# Patient Record
Sex: MALE | Race: BLACK OR AFRICAN AMERICAN | Employment: OTHER | URBAN - METROPOLITAN AREA
[De-identification: names, ages, dates, MRNs, and addresses within clinical notes are randomized per-mention and may not be internally consistent; named-entity substitution may affect disease eponyms.]

---

## 2017-01-17 ENCOUNTER — OFFICE VISIT (OUTPATIENT)
Dept: ORTHOPEDIC SURGERY | Age: 72
End: 2017-01-17

## 2017-01-17 VITALS
HEIGHT: 66 IN | WEIGHT: 210.4 LBS | HEART RATE: 60 BPM | RESPIRATION RATE: 18 BRPM | SYSTOLIC BLOOD PRESSURE: 155 MMHG | BODY MASS INDEX: 33.82 KG/M2 | DIASTOLIC BLOOD PRESSURE: 76 MMHG

## 2017-01-17 DIAGNOSIS — M54.16 RADICULOPATHY, LUMBAR REGION: ICD-10-CM

## 2017-01-17 DIAGNOSIS — M48.061 SPINAL STENOSIS, LUMBAR: Primary | ICD-10-CM

## 2017-01-17 RX ORDER — DIAZEPAM 10 MG/1
TABLET ORAL
Qty: 1 TAB | Refills: 0 | OUTPATIENT
Start: 2017-01-17 | End: 2017-02-21 | Stop reason: ALTCHOICE

## 2017-01-17 NOTE — PROGRESS NOTES
[facility]  [address], [place]   Phone: [phone]  Fax: [phone]        PROGRESS NOTE      HISTORY OF PRESENT ILLNESS:  The patient is a 80 y.o. male and was seen today for follow up of low back pain without radiculopathy. Previously, he did report radicular symptoms extending into the bilateral lower extremities in an S1 distribution with right lower extremity symptoms greater than left to the feet. He actually describes lower extremity symptoms more as paresthesias and not so much pain. He reports symptoms began 10 years ago. He reported limitations in standing and walking tolerance. He describes symptoms consistent for stenosis. He is taking Norco for pain relief, which he reports really is not helping much. He has failed NEURONTIN and TOPAMAX. Patient is intolerant to CYMBALTA 30 mg. He does report having earaches with chewing after starting medication which I suspect is unrelated. The patient has a history of DM and reports blood sugars are well controlled, consistently remaining below 200. He reports resolution of numbness of the BLE with L3/4 epidural from 9/14/16. He is still having pain and a feeling of weakness in his lower back, especially while cutting the grass. MRI of the lumbar spine per report, there was severe L4-L5, moderate L3-L4 and L5-S1 and mild L2-L3 level central stenosis from a combination of minimal degenerative disc disease, mild to moderate facet joint arthrosis, ligamentum flavum thickening and developmental minimal to mild canal stenosis. There was mild to moderate left L4-L5, mild right L4-L5, bilateral L5-S1 and L3-L4 level foraminal stenosis. There was borderline left L4 nerve root impingement. There was mild L5-S1 Baastrup's disease/intraspinous ligament degenerative and/or bursitis.  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 At his last clinical appointment, treatment options, including surgery, continued injections, PT, and other medications were discussed. At that time the patient wished to wait and see if relief continued from the block. The patient returns today with low back and recurrence of BLE symptoms which extend in an S1 distribution to the ankle with paraesthesias. He rates pain 0-6/10, an increase since his last visit (2-4/10). Patient states BLE symptoms presented again two weeks after his block on 9/14/16 which he did not report at his last visit. He believes his symptoms have progressed.  reviewed. Past Medical History   Diagnosis Date    Arthritis     Diabetes ([facility] [address]     GERD (gastroesophageal reflux disease)     Gout     Hypercholesterolemia     Hypertension     Low back pain     Vitamin D deficiency         Social History     Social History    Marital status:      Spouse name: N/A    Number of children: N/A    Years of education: N/A     Occupational History    Not on file. Social History Main Topics    Smoking status: Never Smoker    Smokeless tobacco: Never Used    Alcohol use No    Drug use: No    Sexual activity: Not on file     Other Topics Concern    Not on file     Social History Narrative       Current Outpatient Prescriptions   Medication Sig Dispense Refill    ergocalciferol (VITAMIN D2) 50,000 unit capsule Take 50,000 Units by mouth.  amLODIPine (NORVASC) 10 mg tablet Take  by mouth daily.  benazepril-hydrochlorthiazide (LOTENSIN HCT) 10-12.5 mg per tablet Take  by mouth daily.  metoprolol succinate (TOPROL-XL) 200 mg XL tablet Take 200 mg by mouth daily.  HYDROCODONE/ACETAMINOPHEN (NORCO PO) Take  by mouth.  diclofenac EC (VOLTAREN) 75 mg EC tablet Take  by mouth.  atorvastatin (LIPITOR) 20 mg tablet Take  by mouth daily.       metFORMIN (GLUCOPHAGE) 500 mg tablet Take  by mouth two (2) times daily (with meals).  glipiZIDE (GLUCOTROL) 10 mg tablet Take 10 mg by mouth two (2) times a day.  esomeprazole (NEXIUM) 40 mg capsule Take  by mouth daily.  diazepam (VALIUM) 10 mg tablet Take 1 tab by mouth as directed by nurse prior to procedure 1 Tab 0    DULoxetine (CYMBALTA) 30 mg capsule 1 tab PO Daily 30 Cap 1    topiramate (TOPAMAX) 25 mg tablet 3 tabs PO QHS 90 Tab 1    gabapentin (NEURONTIN) 600 mg tablet Take 1 Tab by mouth three (3) times daily. 90 Tab 1    gabapentin (NEURONTIN) 300 mg capsule Take 1 Cap by mouth three (3) times daily. 90 Cap 1    colchicine (COLCRYS) 0.6 mg tablet Take 0.6 mg by mouth daily. Allergies   Allergen Reactions    Topamax [Topiramate] Other (comments)     Aching and popping behind left ear          PHYSICAL EXAMINATION    Visit Vitals    /76 (BP 1 Location: Right arm, BP Patient Position: Sitting)    Pulse 60    Resp 18    Ht 5' 6\" (1.676 m)    Wt 210 lb 6.4 oz (95.4 kg)    BMI 33.96 kg/m2       CONSTITUTIONAL: NAD, A&O x 3  SENSATION: Intact to light touch throughout  RANGE OF MOTION: The patient has full passive range of motion in all four extremities. MOTOR:  Straight Leg Raise: Negative, bilateral               Hip Flex Knee Ext Knee Flex Ankle DF GTE Ankle PF Tone   Right +4/5 +4/5 +4/5 +4/5 +4/5 +4/5 +4/5   Left +4/5 +4/5 +4/5 +4/5 +4/5 +4/5 +4/5       ASSESSMENT   Racheal Magallanes was seen today for back pain and leg pain. Diagnoses and all orders for this visit:    Spinal stenosis, lumbar  -     SCHEDULE SURGERY    Radiculopathy, lumbar region  -     SCHEDULE SURGERY          IMPRESSION AND PLAN:  He has continued low back pain and reports recurrence of his bilateral lower extremity symptoms which he describes as S1 radiculopathy. I suspect his symptoms are a combination of lumbar radiculopathy and peripheral neuropathy, but primarily due to radiculopathy.  Patient declines physical therapy at this time. He elects to proceed with a lumbar block. I will order a L3/4 epidural. I will see the patient back following block. Written by Danny Brandt, as dictated by Betty Rutledge MD  I examined the patient, reviewed and agree with the note.

## 2017-01-17 NOTE — MR AVS SNAPSHOT
Visit Information Date & Time Provider Department Dept. Phone Encounter #  
 1/17/2017 11:20 AM Carin Harman  [facility], [address] and [facility] - [place] [phone] Follow-up Instructions Return for following block. Upcoming Health Maintenance Date Due Hepatitis C Screening 1945 DTaP/Tdap/Td series (1 - Tdap) 6/11/1967 FOBT Q 1 YEAR AGE 50-75 6/11/1996 ZOSTER VACCINE AGE 60> 6/11/2006 GLAUCOMA SCREENING Q2Y 6/11/2011 Pneumococcal 65+ Low/Medium Risk (1 of 2 - PCV13) 6/11/2011 MEDICARE YEARLY EXAM 6/11/2011 INFLUENZA AGE 9 TO ADULT 8/1/2016 Allergies as of 1/17/2017  Review Complete On: 1/17/2017 By: Carin Harman MD  
  
 Severity Noted Reaction Type Reactions Topamax [Topiramate]  02/17/2016    Other (comments) Aching and popping behind left ear Current Immunizations  Never Reviewed No immunizations on file. Not reviewed this visit You Were Diagnosed With   
  
 Codes Comments Spinal stenosis, lumbar    -  Primary ICD-10-CM: M48.06 
ICD-9-CM: 724.02 Radiculopathy, lumbar region     ICD-10-CM: M54.16 
ICD-9-CM: 724.4 Vitals BP Pulse Resp Height(growth percentile) Weight(growth percentile) BMI  
 155/76 (BP 1 Location: Right arm, BP Patient Position: Sitting) 60 18 5' 6\" (1.676 m) 210 lb 6.4 oz (95.4 kg) 33.96 kg/m2 Smoking Status Never Smoker BMI and BSA Data Body Mass Index Body Surface Area  
 33.96 kg/m 2 2.11 m 2 Preferred Pharmacy Pharmacy Name Phone [facility] [address], [address] Your Updated Medication List  
  
   
This list is accurate as of: 1/17/17 12:25 PM.  Always use your most recent med list. amLODIPine 10 mg tablet Commonly known as:  Dennise Pace Take  by mouth daily. atorvastatin 20 mg tablet Commonly known as:  LIPITOR Take  by mouth daily. benazepril-hydroCHLOROthiazide 10-12.5 mg per tablet Commonly known as:  LOTENSIN HCT Take  by mouth daily. COLCRYS 0.6 mg tablet Generic drug:  colchicine Take 0.6 mg by mouth daily. diazePAM 10 mg tablet Commonly known as:  VALIUM Take 1 tab by mouth as directed by nurse prior to procedure  
  
 diclofenac EC 75 mg EC tablet Commonly known as:  VOLTAREN Take  by mouth. DULoxetine 30 mg capsule Commonly known as:  CYMBALTA 1 tab PO Daily * gabapentin 300 mg capsule Commonly known as:  NEURONTIN Take 1 Cap by mouth three (3) times daily. * gabapentin 600 mg tablet Commonly known as:  NEURONTIN Take 1 Tab by mouth three (3) times daily. glipiZIDE 10 mg tablet Commonly known as:  Atlee Crystal Take 10 mg by mouth two (2) times a day. metFORMIN 500 mg tablet Commonly known as:  GLUCOPHAGE Take  by mouth two (2) times daily (with meals). metoprolol succinate 200 mg XL tablet Commonly known as:  TOPROL-XL Take 200 mg by mouth daily. NexIUM 40 mg capsule Generic drug:  esomeprazole Take  by mouth daily. NORCO PO Take  by mouth. topiramate 25 mg tablet Commonly known as:  TOPAMAX 3 tabs PO QHS  
  
 VITAMIN D2 50,000 unit capsule Generic drug:  ergocalciferol Take 50,000 Units by mouth. * Notice: This list has 2 medication(s) that are the same as other medications prescribed for you. Read the directions carefully, and ask your doctor or other care provider to review them with you. Follow-up Instructions Return for following block. Introducing [facility]! Jeanie Lopez introduces b5media patient portal. Now you can access parts of your medical record, email your doctor's office, and request medication refills online. 1. In your internet browser, go to [email or web address] 2. Click on the First Time User? Click Here link in the Sign In box. You will see the New Member Sign Up page. 3. Enter your Layer Access Code exactly as it appears below. You will not need to use this code after youve completed the sign-up process. If you do not sign up before the expiration date, you must request a new code. · Layer Access Code: [email or web address] Expires: 4/17/2017 11:13 AM 
 
4. Enter the last four digits of your Social Security Number (xxxx) and Date of Birth (mm/dd/yyyy) as indicated and click Submit. You will be taken to the next sign-up page. 5. Create a Layer ID. This will be your Layer login ID and cannot be changed, so think of one that is secure and easy to remember. 6. Create a Layer password. You can change your password at any time. 7. Enter your Password Reset Question and Answer. This can be used at a later time if you forget your password. 8. Enter your e-mail address. You will receive e-mail notification when new information is available in [address] 9. Click Sign Up. You can now view and download portions of your medical record. 10. Click the Download Summary menu link to download a portable copy of your medical information. If you have questions, please visit the Frequently Asked Questions section of the Layer website. Remember, Layer is NOT to be used for urgent needs. For medical emergencies, dial 911. Now available from your iPhone and Android! Please provide this summary of care documentation to your next provider. Your primary care clinician is listed as Ty Rolling. If you have any questions after today's visit, please call [phone].

## 2017-02-21 ENCOUNTER — OFFICE VISIT (OUTPATIENT)
Dept: ORTHOPEDIC SURGERY | Age: 72
End: 2017-02-21

## 2017-02-21 VITALS
DIASTOLIC BLOOD PRESSURE: 79 MMHG | HEART RATE: 67 BPM | RESPIRATION RATE: 18 BRPM | SYSTOLIC BLOOD PRESSURE: 171 MMHG | BODY MASS INDEX: 33.33 KG/M2 | WEIGHT: 207.4 LBS | HEIGHT: 66 IN

## 2017-02-21 DIAGNOSIS — M54.16 RADICULOPATHY, LUMBAR REGION: ICD-10-CM

## 2017-02-21 DIAGNOSIS — M48.061 LUMBAR SPINAL STENOSIS: ICD-10-CM

## 2017-02-21 DIAGNOSIS — G60.9 HEREDITARY AND IDIOPATHIC PERIPHERAL NEUROPATHY: Primary | ICD-10-CM

## 2017-02-21 DIAGNOSIS — M47.816 SPONDYLOSIS WITHOUT MYELOPATHY OR RADICULOPATHY, LUMBAR REGION: ICD-10-CM

## 2017-02-21 RX ORDER — PREGABALIN 75 MG/1
75 CAPSULE ORAL 2 TIMES DAILY
Qty: 14 CAP | Refills: 0 | Status: SHIPPED | OUTPATIENT
Start: 2017-02-21 | End: 2017-03-24 | Stop reason: SDUPTHER

## 2017-02-21 RX ORDER — PREGABALIN 75 MG/1
75 CAPSULE ORAL 2 TIMES DAILY
Qty: 60 CAP | Refills: 1 | Status: SHIPPED | OUTPATIENT
Start: 2017-02-21 | End: 2017-03-24 | Stop reason: SDUPTHER

## 2017-02-21 RX ORDER — PREGABALIN 75 MG/1
75 CAPSULE ORAL 2 TIMES DAILY
Qty: 50 CAP | Refills: 1 | Status: SHIPPED | OUTPATIENT
Start: 2017-02-21 | End: 2017-02-21 | Stop reason: CLARIF

## 2017-02-21 NOTE — MR AVS SNAPSHOT
Visit Information Date & Time Provider Department Dept. Phone Encounter #  
 2/21/2017  8:00 AM Gonzalo Beasley MD 4 [facility], [address] and [facility] - [place] [phone] [id number] Follow-up Instructions Return in about 4 weeks (around 3/21/2017). Upcoming Health Maintenance Date Due Hepatitis C Screening 1945 DTaP/Tdap/Td series (1 - Tdap) 6/11/1967 FOBT Q 1 YEAR AGE 50-75 6/11/1996 ZOSTER VACCINE AGE 60> 6/11/2006 GLAUCOMA SCREENING Q2Y 6/11/2011 Pneumococcal 65+ Low/Medium Risk (1 of 2 - PCV13) 6/11/2011 MEDICARE YEARLY EXAM 6/11/2011 INFLUENZA AGE 9 TO ADULT 8/1/2016 Allergies as of 2/21/2017  Review Complete On: 2/21/2017 By: Gonzalo Beasley MD  
  
 Severity Noted Reaction Type Reactions Topamax [Topiramate]  02/17/2016    Other (comments) Aching and popping behind left ear Current Immunizations  Never Reviewed No immunizations on file. Not reviewed this visit You Were Diagnosed With   
  
 Codes Comments Hereditary and idiopathic peripheral neuropathy    -  Primary ICD-10-CM: G60.9 ICD-9-CM: 356.9 Spondylosis without myelopathy or radiculopathy, lumbar region     ICD-10-CM: M47.816 ICD-9-CM: 721.3 Lumbar spinal stenosis     ICD-10-CM: M48.06 
ICD-9-CM: 724.02 Radiculopathy, lumbar region     ICD-10-CM: M54.16 
ICD-9-CM: 724.4 Vitals BP Pulse Resp Height(growth percentile) Weight(growth percentile) BMI  
 171/79 (BP 1 Location: Left arm, BP Patient Position: Sitting) 67 18 5' 6\" (1.676 m) 207 lb 6.4 oz (94.1 kg) 33.48 kg/m2 Smoking Status Never Smoker Vitals History BMI and BSA Data Body Mass Index Body Surface Area  
 33.48 kg/m 2 2.09 m 2 Preferred Pharmacy Pharmacy Name Phone [facility] [address], [address] Your Updated Medication List  
  
   
 This list is accurate as of: 2/21/17  8:31 AM.  Always use your most recent med list. amLODIPine 10 mg tablet Commonly known as:  Woodrow Citron Take  by mouth daily. atorvastatin 20 mg tablet Commonly known as:  LIPITOR Take  by mouth daily. benazepril-hydroCHLOROthiazide 10-12.5 mg per tablet Commonly known as:  LOTENSIN HCT Take  by mouth daily. COLCRYS 0.6 mg tablet Generic drug:  colchicine Take 0.6 mg by mouth daily. diclofenac EC 75 mg EC tablet Commonly known as:  VOLTAREN Take  by mouth. DULoxetine 30 mg capsule Commonly known as:  CYMBALTA 1 tab PO Daily * gabapentin 300 mg capsule Commonly known as:  NEURONTIN Take 1 Cap by mouth three (3) times daily. * gabapentin 600 mg tablet Commonly known as:  NEURONTIN Take 1 Tab by mouth three (3) times daily. glipiZIDE 10 mg tablet Commonly known as:  Maira Bonds Take 10 mg by mouth two (2) times a day. metFORMIN 500 mg tablet Commonly known as:  GLUCOPHAGE Take  by mouth two (2) times daily (with meals). metoprolol succinate 200 mg XL tablet Commonly known as:  TOPROL-XL Take 200 mg by mouth daily. NexIUM 40 mg capsule Generic drug:  esomeprazole Take  by mouth daily. NORCO PO Take  by mouth. * pregabalin 75 mg capsule Commonly known as:  Walda Smoker Take 1 Cap by mouth two (2) times a day. Max Daily Amount: 150 mg.  
  
 * pregabalin 75 mg capsule Commonly known as:  Walda Smoker Take 1 Cap by mouth two (2) times a day. Max Daily Amount: 150 mg.  
  
 topiramate 25 mg tablet Commonly known as:  TOPAMAX 3 tabs PO QHS  
  
 VITAMIN D2 50,000 unit capsule Generic drug:  ergocalciferol Take 50,000 Units by mouth. * Notice: This list has 4 medication(s) that are the same as other medications prescribed for you. Read the directions carefully, and ask your doctor or other care provider to review them with you. Prescriptions Printed Refills  
 pregabalin (LYRICA) 75 mg capsule 0 Sig: Take 1 Cap by mouth two (2) times a day. Max Daily Amount: 150 mg.  
 Class: Print Route: Oral  
 pregabalin (LYRICA) 75 mg capsule 1 Sig: Take 1 Cap by mouth two (2) times a day. Max Daily Amount: 150 mg.  
 Class: Print Route: Oral  
  
Follow-up Instructions Return in about 4 weeks (around 3/21/2017). Introducing [facility]! Dayanara Nava introduces Photo[employer]ia patient portal. Now you can access parts of your medical record, email your doctor's office, and request medication refills online. 1. In your internet browser, go to [email or web address] 2. Click on the First Time User? Click Here link in the Sign In box. You will see the New Member Sign Up page. 3. Enter your PhotoMania Access Code exactly as it appears below. You will not need to use this code after youve completed the sign-up process. If you do not sign up before the expiration date, you must request a new code. · PhotoMania Access Code: [email or web address] Expires: 4/17/2017 11:13 AM 
 
4. Enter the last four digits of your Social Security Number (xxxx) and Date of Birth (mm/dd/yyyy) as indicated and click Submit. You will be taken to the next sign-up page. 5. Create a PhotoMania ID. This will be your PhotoMania login ID and cannot be changed, so think of one that is secure and easy to remember. 6. Create a PhotoMania password. You can change your password at any time. 7. Enter your Password Reset Question and Answer. This can be used at a later time if you forget your password. 8. Enter your e-mail address. You will receive e-mail notification when new information is available in [address] 9. Click Sign Up. You can now view and download portions of your medical record. 10. Click the Download Summary menu link to download a portable copy of your medical information. If you have questions, please visit the Frequently Asked Questions section of the RadMitt website. Remember, [employer] is NOT to be used for urgent needs. For medical emergencies, dial 911. Now available from your iPhone and Android! Please provide this summary of care documentation to your next provider. Your primary care clinician is listed as Domenico Horner. If you have any questions after today's visit, please call [phone].

## 2017-02-21 NOTE — PROGRESS NOTES
[facility]  [address], [address]  Phone: [phone]  Fax: [phone]        PROGRESS NOTE      HISTORY OF PRESENT ILLNESS:  The patient is a 80 y.o. male and was seen today for follow up of low back and recurrence of BLE symptoms which extend in an S1 distribution to the ankle with paraesthesias. He actually describes lower extremity paraesthesias and not so much pain. He reports symptoms began 10 years ago and have progressed. He reported limitations in standing and walking tolerance. He describes symptoms consistent for stenosis. He is taking Norco for pain relief, which he reports really is not helping much. He has failed NEURONTIN and TOPAMAX. Patient is intolerant to CYMBALTA 30 mg. He does report having earaches with chewing after starting medication which I suspect is unrelated. The patient has a history of DM and reports blood sugars are well controlled, consistently remaining below 200. He reports resolution of numbness of the BLE with L3/4 epidural from 9/14/16. Patient states BLE symptoms presented again two weeks after his block on 9/14/16. He is still having pain and a feeling of weakness in his lower back, especially while cutting the grass. MRI of the lumbar spine per report, there was severe L4-L5, moderate L3-L4 and L5-S1 and mild L2-L3 level central stenosis from a combination of minimal degenerative disc disease, mild to moderate facet joint arthrosis, ligamentum flavum thickening and developmental minimal to mild canal stenosis. There was mild to moderate left L4-L5, mild right L4-L5, bilateral L5-S1 and L3-L4 level foraminal stenosis. There was borderline left L4 nerve root impingement. There was mild L5-S1 Baastrup's disease/intraspinous ligament degenerative and/or bursitis.  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 At his last clinical appointment, he had continued low back pain and reported recurrence of his bilateral lower extremity symptoms which he described as S1 radiculopathy. I suspected his symptoms were a combination of lumbar radiculopathy and peripheral neuropathy, but primarily due to radiculopathy. Patient declined physical therapy at that time. He elected to proceed with a lumbar block. I ordered a L3/4 epidural.     The patient returns today with pain location and distribution remain unchanged. He rates pain 0-5/10, a slight decrease since his last visit (0-6/10). He is status post L3/4 epidural from 1/31/17 reporting temporary relief x 2 weeks. Patient has not attended physical therapy/chiropractor. He reports BLE edema usually at night while sleeping which is more consistent with a vascular issue.  reviewed. Past Medical History   Diagnosis Date    Arthritis     Diabetes ([facility] [address]     GERD (gastroesophageal reflux disease)     Gout     Hypercholesterolemia     Hypertension     Low back pain     Vitamin D deficiency         Social History     Social History    Marital status:      Spouse name: N/A    Number of children: N/A    Years of education: N/A     Occupational History    Not on file. Social History Main Topics    Smoking status: Never Smoker    Smokeless tobacco: Never Used    Alcohol use No    Drug use: No    Sexual activity: Not on file     Other Topics Concern    Not on file     Social History Narrative       Current Outpatient Prescriptions   Medication Sig Dispense Refill    pregabalin (LYRICA) 75 mg capsule Take 1 Cap by mouth two (2) times a day. Max Daily Amount: 150 mg. 14 Cap 0    pregabalin (LYRICA) 75 mg capsule Take 1 Cap by mouth two (2) times a day. Max Daily Amount: 150 mg. 60 Cap 1    ergocalciferol (VITAMIN D2) 50,000 unit capsule Take 50,000 Units by mouth.       amLODIPine (NORVASC) 10 mg tablet Take  by mouth daily.  benazepril-hydrochlorthiazide (LOTENSIN HCT) 10-12.5 mg per tablet Take  by mouth daily.  metoprolol succinate (TOPROL-XL) 200 mg XL tablet Take 200 mg by mouth daily.  colchicine (COLCRYS) 0.6 mg tablet Take 0.6 mg by mouth daily.  atorvastatin (LIPITOR) 20 mg tablet Take  by mouth daily.  metFORMIN (GLUCOPHAGE) 500 mg tablet Take  by mouth two (2) times daily (with meals).  glipiZIDE (GLUCOTROL) 10 mg tablet Take 10 mg by mouth two (2) times a day.  esomeprazole (NEXIUM) 40 mg capsule Take  by mouth daily.  DULoxetine (CYMBALTA) 30 mg capsule 1 tab PO Daily 30 Cap 1    topiramate (TOPAMAX) 25 mg tablet 3 tabs PO QHS 90 Tab 1    gabapentin (NEURONTIN) 600 mg tablet Take 1 Tab by mouth three (3) times daily. 90 Tab 1    gabapentin (NEURONTIN) 300 mg capsule Take 1 Cap by mouth three (3) times daily. 90 Cap 1    HYDROCODONE/ACETAMINOPHEN (NORCO PO) Take  by mouth.  diclofenac EC (VOLTAREN) 75 mg EC tablet Take  by mouth. Allergies   Allergen Reactions    Topamax [Topiramate] Other (comments)     Aching and popping behind left ear          PHYSICAL EXAMINATION    Visit Vitals    /79 (BP 1 Location: Left arm, BP Patient Position: Sitting)  Comment: PT JUST TOOK HIS BP MEDS/ASYMPTOMATIC    Pulse 67    Resp 18    Ht 5' 6\" (1.676 m)    Wt 207 lb 6.4 oz (94.1 kg)    BMI 33.48 kg/m2       CONSTITUTIONAL: NAD, A&O x 3  SENSATION: Intact to light touch throughout  RANGE OF MOTION: The patient has full passive range of motion in all four extremities. MOTOR:  Straight Leg Raise: Negative, bilateral               Hip Flex Knee Ext Knee Flex Ankle DF GTE Ankle PF Tone   Right +4/5 +4/5 +4/5 +4/5 +4/5 +4/5 +4/5   Left +4/5 +4/5 +4/5 +4/5 +4/5 +4/5 +4/5       ASSESSMENT   Jacob Velazquez was seen today for back pain, leg pain and foot pain.     Diagnoses and all orders for this visit:    Hereditary and idiopathic peripheral neuropathy    Spondylosis without myelopathy or radiculopathy, lumbar region    Lumbar spinal stenosis    Radiculopathy, lumbar region    Other orders  -     Discontinue: pregabalin (LYRICA) 75 mg capsule; Take 1 Cap by mouth two (2) times a day. Max Daily Amount: 150 mg.  -     pregabalin (LYRICA) 75 mg capsule; Take 1 Cap by mouth two (2) times a day. Max Daily Amount: 150 mg.  -     pregabalin (LYRICA) 75 mg capsule; Take 1 Cap by mouth two (2) times a day. Max Daily Amount: 150 mg.          IMPRESSION AND PLAN:  He declines physical therapy at this time. I will try him on Lyrica 75 mg BID. The risks, benefits, and potential side effects of this medication were discussed. Patient understands and wished to proceed. Patient advised to call the office if intolerant to new medication. I will see the patient back in 1 month's time or earlier if needed. Written by Danette Turcios, as dictated by Lay Hogan MD  I examined the patient, reviewed and agree with the note.

## 2017-03-24 ENCOUNTER — OFFICE VISIT (OUTPATIENT)
Dept: ORTHOPEDIC SURGERY | Age: 72
End: 2017-03-24

## 2017-03-24 VITALS
BODY MASS INDEX: 35.03 KG/M2 | DIASTOLIC BLOOD PRESSURE: 66 MMHG | HEART RATE: 59 BPM | OXYGEN SATURATION: 97 % | WEIGHT: 218 LBS | RESPIRATION RATE: 20 BRPM | SYSTOLIC BLOOD PRESSURE: 149 MMHG | HEIGHT: 66 IN | TEMPERATURE: 99.3 F

## 2017-03-24 DIAGNOSIS — G60.9 HEREDITARY AND IDIOPATHIC PERIPHERAL NEUROPATHY: ICD-10-CM

## 2017-03-24 DIAGNOSIS — M54.16 RADICULOPATHY, LUMBAR REGION: ICD-10-CM

## 2017-03-24 DIAGNOSIS — M48.061 SPINAL STENOSIS, LUMBAR REGION: Primary | ICD-10-CM

## 2017-03-24 DIAGNOSIS — M47.816 SPONDYLOSIS WITHOUT MYELOPATHY OR RADICULOPATHY, LUMBAR REGION: ICD-10-CM

## 2017-03-24 RX ORDER — PREGABALIN 75 MG/1
75 CAPSULE ORAL 2 TIMES DAILY
Qty: 60 CAP | Refills: 2 | Status: SHIPPED | OUTPATIENT
Start: 2017-03-24

## 2017-03-24 NOTE — PROGRESS NOTES
[facility]  [address]  [address], [address]  Phone: [phone]  Fax: [phone]        PROGRESS NOTE      HISTORY OF PRESENT ILLNESS:  The patient is a 80 y.o. male and was seen today for follow up of low back and recurrence of BLE symptoms which extend in an S1 distribution to the ankle with paraesthesias. He actually describes lower extremity paraesthesias and not so much pain. He reports symptoms began 10 years ago and have progressed. He reported limitations in standing and walking tolerance. He describes symptoms consistent for stenosis. Patient is still having pain and a feeling of weakness in his lower back, especially while cutting the grass. He reports BLE edema usually at night while sleeping which is more consistent with a vascular issue. Patient has not attended physical therapy/chiropractor. He is taking [place] for pain relief, which he reports really is not helping much. He has failed NEURONTIN and TOPAMAX. Patient is intolerant to CYMBALTA 30 mg. He does report having earaches with chewing after starting medication which I suspect is unrelated. The patient has a history of DM and reports blood sugars are well controlled, consistently remaining below 200. He reports resolution of numbness of the BLE with L3/4 epidural from 9/14/16. Patient states BLE symptoms presented again two weeks after his block on 9/14/16. He underwent L3/4 epidural from 1/31/17 reporting temporary relief x 2 weeks. MRI of the lumbar spine per report, there was severe L4-L5, moderate L3-L4 and L5-S1 and mild L2-L3 level central stenosis from a combination of minimal degenerative disc disease, mild to moderate facet joint arthrosis, ligamentum flavum thickening and developmental minimal to mild canal stenosis. There was mild to moderate left L4-L5, mild right L4-L5, bilateral L5-S1 and L3-L4 level foraminal stenosis. There was borderline left L4 nerve root impingement.  There was mild L5-S1 Baastrup's disease/intraspinous ligament degenerative and/or bursitis. 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 At his last clinical appointment, he declined physical therapy at that time. I tried him on Lyrica 75 mg BID. The patient returns today with pain location and distribution remain unchanged. Pt no longer has distal BLE paraesthesias. He rates pain 0-3/10, improved since his last visit (0-5/10). Pt is tolerating Lyrica 75 mg BID with complete resolution of distal BLE paraesthesias.  reviewed. Past Medical History:   Diagnosis Date    Arthritis     Diabetes ([address]     GERD (gastroesophageal reflux disease)     Gout     Hypercholesterolemia     Hypertension     Low back pain     Vitamin D deficiency         Social History     Social History    Marital status:      Spouse name: N/A    Number of children: N/A    Years of education: N/A     Occupational History    Not on file. Social History Main Topics    Smoking status: Never Smoker    Smokeless tobacco: Never Used    Alcohol use No    Drug use: No    Sexual activity: Not on file     Other Topics Concern    Not on file     Social History Narrative       Current Outpatient Prescriptions   Medication Sig Dispense Refill    pregabalin (LYRICA) 75 mg capsule Take 1 Cap by mouth two (2) times a day. Max Daily Amount: 150 mg. 60 Cap 2    amLODIPine (NORVASC) 10 mg tablet Take  by mouth daily.  benazepril-hydrochlorthiazide (LOTENSIN HCT) 10-12.5 mg per tablet Take  by mouth daily.  metoprolol succinate (TOPROL-XL) 200 mg XL tablet Take 200 mg by mouth daily.  HYDROCODONE/ACETAMINOPHEN (NORCO PO) Take  by mouth.  colchicine (COLCRYS) 0.6 mg tablet Take 0.6 mg by mouth daily.       diclofenac EC (VOLTAREN) 75 mg EC tablet Take  by mouth two (2) times a day.  atorvastatin (LIPITOR) 20 mg tablet Take  by mouth daily.  metFORMIN (GLUCOPHAGE) 500 mg tablet Take  by mouth two (2) times daily (with meals).  glipiZIDE (GLUCOTROL) 10 mg tablet Take 10 mg by mouth two (2) times a day.  esomeprazole (NEXIUM) 40 mg capsule Take  by mouth daily.  DULoxetine (CYMBALTA) 30 mg capsule 1 tab PO Daily 30 Cap 1    topiramate (TOPAMAX) 25 mg tablet 3 tabs PO QHS 90 Tab 1    ergocalciferol (VITAMIN D2) 50,000 unit capsule Take 50,000 Units by mouth.  gabapentin (NEURONTIN) 600 mg tablet Take 1 Tab by mouth three (3) times daily. 90 Tab 1    gabapentin (NEURONTIN) 300 mg capsule Take 1 Cap by mouth three (3) times daily. 90 Cap 1       Allergies   Allergen Reactions    Topamax [Topiramate] Other (comments)     Aching and popping behind left ear          PHYSICAL EXAMINATION    Visit Vitals    /66 (BP 1 Location: Left arm, BP Patient Position: Sitting)    Pulse (!) 59    Temp 99.3 °F (37.4 °C) (Oral)    Resp 20    Ht 5' 6\" (1.676 m)    Wt 218 lb (98.9 kg)    SpO2 97%    BMI 35.19 kg/m2       CONSTITUTIONAL: NAD, A&O x 3  SENSATION: Intact to light touch throughout  RANGE OF MOTION: The patient has full passive range of motion in all four extremities. MOTOR:  Straight Leg Raise: Negative, bilateral               Hip Flex Knee Ext Knee Flex Ankle DF GTE Ankle PF Tone   Right +4/5 +4/5 +4/5 +4/5 +4/5 +4/5 +4/5   Left +4/5 +4/5 +4/5 +4/5 +4/5 +4/5 +4/5       ASSESSMENT   Arliss Alpers was seen today for back pain. Diagnoses and all orders for this visit:    Spinal stenosis, lumbar region    Radiculopathy, lumbar region    Hereditary and idiopathic peripheral neuropathy    Spondylosis without myelopathy or radiculopathy, lumbar region    Other orders  -     pregabalin (LYRICA) 75 mg capsule; Take 1 Cap by mouth two (2) times a day.  Max Daily Amount: 150 mg.          IMPRESSION AND PLAN:  His pain appears to have centralized and is mild. We did discuss increasing his Lyrica dose to 150 mg BID. He declined. Patient wished to continue his current treatment. He was provided with refills of Lyrica 75 mg BID. I will see the patient back in 3 month's time or earlier if needed. Written by Lorna Morris, as dictated by Cassius Cowden, MD  I examined the patient, reviewed and agree with the note.

## 2017-03-24 NOTE — MR AVS SNAPSHOT
Visit Information Date & Time Provider Department Dept. Phone Encounter #  
 3/24/2017  9:05 AM Selina Felder, [address] [facility] - [place] [id number] [phone] Follow-up Instructions Return in about 3 months (around 6/24/2017). Upcoming Health Maintenance Date Due Hepatitis C Screening 1945 DTaP/Tdap/Td series (1 - Tdap) 6/11/1967 FOBT Q 1 YEAR AGE 50-75 6/11/1996 ZOSTER VACCINE AGE 60> 6/11/2006 GLAUCOMA SCREENING Q2Y 6/11/2011 Pneumococcal 65+ Low/Medium Risk (1 of 2 - PCV13) 6/11/2011 MEDICARE YEARLY EXAM 6/11/2011 INFLUENZA AGE 9 TO ADULT 8/1/2016 Allergies as of 3/24/2017  Review Complete On: 3/24/2017 By: Selina Felder MD  
  
 Severity Noted Reaction Type Reactions Topamax [Topiramate]  02/17/2016    Other (comments) Aching and popping behind left ear Current Immunizations  Never Reviewed No immunizations on file. Not reviewed this visit You Were Diagnosed With   
  
 Codes Comments Spinal stenosis, lumbar region    -  Primary ICD-10-CM: M48.06 
ICD-9-CM: 724.02 Radiculopathy, lumbar region     ICD-10-CM: M54.16 
ICD-9-CM: 724.4 Hereditary and idiopathic peripheral neuropathy     ICD-10-CM: G60.9 ICD-9-CM: 356.9 Spondylosis without myelopathy or radiculopathy, lumbar region     ICD-10-CM: M47.816 ICD-9-CM: 721.3 Vitals BP Pulse Temp Resp Height(growth percentile) Weight(growth percentile) 149/66 (BP 1 Location: Left arm, BP Patient Position: Sitting) (!) 59 99.3 °F (37.4 °C) (Oral) 20 5' 6\" (1.676 m) 218 lb (98.9 kg) SpO2 BMI Smoking Status 97% 35.19 kg/m2 Never Smoker Vitals History BMI and BSA Data Body Mass Index Body Surface Area  
 35.19 kg/m 2 2.15 m 2 Preferred Pharmacy Pharmacy Name Phone [facility] [address], [address] [facility] Your Updated Medication List  
  
 This list is accurate as of: 3/24/17  9:50 AM.  Always use your most recent med list. amLODIPine 10 mg tablet Commonly known as:  [place] Fanti Take  by mouth daily. atorvastatin 20 mg tablet Commonly known as:  LIPITOR Take  by mouth daily. benazepril-hydroCHLOROthiazide 10-12.5 mg per tablet Commonly known as:  LOTENSIN HCT Take  by mouth daily. COLCRYS 0.6 mg tablet Generic drug:  colchicine Take 0.6 mg by mouth daily. diclofenac EC 75 mg EC tablet Commonly known as:  VOLTAREN Take  by mouth two (2) times a day. DULoxetine 30 mg capsule Commonly known as:  CYMBALTA 1 tab PO Daily * gabapentin 300 mg capsule Commonly known as:  NEURONTIN Take 1 Cap by mouth three (3) times daily. * gabapentin 600 mg tablet Commonly known as:  NEURONTIN Take 1 Tab by mouth three (3) times daily. glipiZIDE 10 mg tablet Commonly known as:  [place] Take 10 mg by mouth two (2) times a day. metFORMIN 500 mg tablet Commonly known as:  GLUCOPHAGE Take  by mouth two (2) times daily (with meals). metoprolol succinate 200 mg XL tablet Commonly known as:  TOPROL-XL Take 200 mg by mouth daily. NexIUM 40 mg capsule Generic drug:  esomeprazole Take  by mouth daily. NORCO PO Take  by mouth.  
  
 pregabalin 75 mg capsule Commonly known as:  [place] Take 1 Cap by mouth two (2) times a day. Max Daily Amount: 150 mg.  
  
 topiramate 25 mg tablet Commonly known as:  TOPAMAX 3 tabs PO QHS  
  
 VITAMIN D2 50,000 unit capsule Generic drug:  ergocalciferol Take 50,000 Units by mouth. * Notice: This list has 2 medication(s) that are the same as other medications prescribed for you. Read the directions carefully, and ask your doctor or other care provider to review them with you. Prescriptions Printed  Refills  
 pregabalin (LYRICA) 75 mg capsule 2  
 Sig: Take 1 Cap by mouth two (2) times a day. Max Daily Amount: 150 mg.  
 Class: Print Route: Oral  
  
Follow-up Instructions Return in about 3 months (around 6/24/2017). Introducing [facility] SERVICES! Randy Sanchez introduces [employer] patient portal. Now you can access parts of your medical record, email your doctor's office, and request medication refills online. 1. In your internet browser, go to [email or web address] 2. Click on the First Time User? Click Here link in the Sign In box. You will see the New Member Sign Up page. 3. Enter your [employer] Access Code exactly as it appears below. You will not need to use this code after youve completed the sign-up process. If you do not sign up before the expiration date, you must request a new code. · [employer] Access Code: [email or web address] Expires: 4/17/2017 12:13 PM 
 
4. Enter the last four digits of your Social Security Number (xxxx) and Date of Birth (mm/dd/yyyy) as indicated and click Submit. You will be taken to the next sign-up page. 5. Create a [employer] ID. This will be your [employer] login ID and cannot be changed, so think of one that is secure and easy to remember. 6. Create a [employer] password. You can change your password at any time. 7. Enter your Password Reset Question and Answer. This can be used at a later time if you forget your password. 8. Enter your e-mail address. You will receive e-mail notification when new information is available in [address] 9. Click Sign Up. You can now view and download portions of your medical record. 10. Click the Download Summary menu link to download a portable copy of your medical information. If you have questions, please visit the Frequently Asked Questions section of the [employer] website. Remember, [employer] is NOT to be used for urgent needs. For medical emergencies, dial 911. Now available from your iPhone and Android! Please provide this summary of care documentation to your next provider. Your primary care clinician is listed as Geovani Murillo. If you have any questions after today's visit, please call [phone].

## 2019-05-01 ENCOUNTER — HOSPITAL ENCOUNTER (OUTPATIENT)
Dept: LAB | Age: 74
Discharge: HOME | End: 2019-05-01
Payer: MEDICARE

## 2019-05-01 DIAGNOSIS — Z01.818 PRE-OP EVALUATION: ICD-10-CM

## 2019-05-01 LAB
ANION GAP SERPL CALC-SCNC: 8 MMOL/L (ref 3–18)
APTT PPP: 30.8 SEC (ref 23–36.4)
BUN SERPL-MCNC: 11 MG/DL (ref 7–18)
BUN/CREAT SERPL: 10 (ref 12–20)
CALCIUM SERPL-MCNC: 9.6 MG/DL (ref 8.5–10.1)
CHLORIDE SERPL-SCNC: 112 MMOL/L (ref 100–108)
CO2 SERPL-SCNC: 27 MMOL/L (ref 21–32)
CREAT SERPL-MCNC: 1.09 MG/DL (ref 0.6–1.3)
ERYTHROCYTE [DISTWIDTH] IN BLOOD BY AUTOMATED COUNT: 14.1 % (ref 11.6–14.5)
GLUCOSE SERPL-MCNC: 72 MG/DL (ref 74–99)
HCT VFR BLD AUTO: 42.8 % (ref 36–48)
HGB BLD-MCNC: 14.2 G/DL (ref 13–16)
INR PPP: 0.9 (ref 0.8–1.2)
MCH RBC QN AUTO: 28.9 PG (ref 24–34)
MCHC RBC AUTO-ENTMCNC: 33.2 G/DL (ref 31–37)
MCV RBC AUTO: 87 FL (ref 74–97)
PLATELET # BLD AUTO: 204 K/UL (ref 135–420)
PMV BLD AUTO: 11.5 FL (ref 9.2–11.8)
POTASSIUM SERPL-SCNC: 3.8 MMOL/L (ref 3.5–5.5)
PROTHROMBIN TIME: 12.3 SEC (ref 11.5–15.2)
RBC # BLD AUTO: 4.92 M/UL (ref 4.7–5.5)
SODIUM SERPL-SCNC: 147 MMOL/L (ref 136–145)
WBC # BLD AUTO: 7.1 K/UL (ref 4.6–13.2)

## 2019-05-01 PROCEDURE — 36415 COLL VENOUS BLD VENIPUNCTURE: CPT

## 2019-05-01 PROCEDURE — 85730 THROMBOPLASTIN TIME PARTIAL: CPT

## 2019-05-01 PROCEDURE — 85610 PROTHROMBIN TIME: CPT

## 2019-05-01 PROCEDURE — 85027 COMPLETE CBC AUTOMATED: CPT

## 2019-05-01 PROCEDURE — 93005 ELECTROCARDIOGRAM TRACING: CPT

## 2019-05-01 PROCEDURE — 80048 BASIC METABOLIC PNL TOTAL CA: CPT

## 2019-05-01 NOTE — H&P (VIEW-ONLY)
Angi Barnes 1945 ASSESSMENT:  
1. 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 History of urinary retention Passed VT 3/13/19, PVR- 104cc 3. Lower back pain with intermittent paraesthesias 4. Prostate cancer screening DANIELLE 3/13/19- 40 grams with right apex nodule and left base induration No fhx of prostate cancer Last PSA 4/15/19- 5.36ng/mL Discussed proceeding to cystoscopy under anesthesia with transurethral resection of suspicious area as well as transrectal ultrasound guided biopsy of peripheral zone. PLAN:   
1. Urine today sent for culture 2. Continue Flomax and Finasteride 3. Patient to have CT urogram prior to surgery 4. Plan for TURP and TRUS bx to differentiate between urothelial and prostatic carcinoma 5. RTC post-op to discuss pathology Follow-up and Dispositions · Return for Please schedule for CT urogram in the next 1-2 weeks (order is already in), RTC post op. DISCUSSION: 
Patient's BMI is out of the normal parameters. Information about BMI was given and patient was advised to follow-up with their PCP for further management. Prostate Biopsy: We discussed the risks and benefits of transrectal ultrasound guided prostate biopsy including possible pain, infection/sepsis (estimated risk: 3-5%), prostatitis, bleeding, and inconclusive diagnostic information necessitating close follow up or repeat biopsy. He expressed understanding and desire to proceed with prostate biopsy.   
 
Risks and benefits of TURP were reviewed in detail including infection, bleeding, blood transfusion, injury to bladder/urethra/surrounding structures, erectile dysfunction, urinary incontinence, bladder neck contracture, persistent obstructive and irritative voiding symptoms, and global anesthesia risks including but not limited to CVA, MI, DVT, PE, pneumonia, and death. He expressed understanding and desire to proceed. Chief Complaint Patient presents with  Cystoscopy HISTORY OF PRESENT ILLNESS:  Nette Middleton is a 68 y.o. male who is seen in follow up of BPH with LUTS, urinary retention, and gross hematuria. Passed VT at last visit  3/13/19. On Flomax and Finasteride (began Finasteride  3/13/19). No urinary complaints at this time. Reports a good FOS with sensation of complete bladder emptying. He has had blood in his urine in the past. Presents today for cystoscopy for evaluation of gross hematuria and urinary retention. The patient denies gross hematuria, dysuria, urinary frequency, urinary incontinence, urinary hesitancy, f/c/n/v, abdominal pain, and flank pain. No hx of nephrolithiasis, hx of bladder stones, hx of recurrent UTI's. No blood thinner use. No hx of CVA or CAD. No fhx of prostate cancer Previously reporting lower back pain that causes paraesthesias in his lower extremities, planning to seen an orthopedic surgeon. Previously reported difficulty passing urine during paraesthesias,  he is able to urinate once they resolve REVIEW OF LABS & IMAGING: 
Lab Results Component Value Date/Time  
 Prostate Specific Ag 5.36 (A) 04/15/2019 Prostate Specific Ag 8.54 (H) 03/13/2019 03:42 PM  
 Cytology 3/13/19: 
CYTOLOGIC DIAGNOSIS:  
   
Atypical urothelial cells, cannot exclude neoplasm. No flowsheet data found. Past Medical History:  
Diagnosis Date  Arthritis  Diabetes ([address]  GERD (gastroesophageal reflux disease)  Gout  Hypercholesterolemia  Hypertension  Low back pain  Vitamin D deficiency No past surgical history on file. Social History Tobacco Use  Smoking status: Never Smoker  Smokeless tobacco: Never Used Substance Use Topics  Alcohol use: No  
  Alcohol/week: 0.0 oz  Drug use: No  
 
 
Allergies Allergen Reactions  Topamax [Topiramate] Other (comments) Aching and popping behind left ear Family History Problem Relation Age of Onset  Asthma Other  Heart Disease Other  Diabetes Other  Cancer Other  Hypertension Other Current Outpatient Medications Medication Sig Dispense Refill  finasteride (PROSCAR) 5 mg tablet Take 1 Tab by mouth daily. 90 Tab 3  
 tamsulosin (FLOMAX) 0.4 mg capsule Take 1 Cap by mouth daily (after dinner). 90 Cap 3  pregabalin (LYRICA) 75 mg capsule Take 1 Cap by mouth two (2) times a day. Max Daily Amount: 150 mg. 60 Cap 2  
 DULoxetine (CYMBALTA) 30 mg capsule 1 tab PO Daily 30 Cap 1  
 topiramate (TOPAMAX) 25 mg tablet 3 tabs PO QHS 90 Tab 1  
 ergocalciferol (VITAMIN D2) 50,000 unit capsule Take 50,000 Units by mouth.  gabapentin (NEURONTIN) 600 mg tablet Take 1 Tab by mouth three (3) times daily. 90 Tab 1  
 gabapentin (NEURONTIN) 300 mg capsule Take 1 Cap by mouth three (3) times daily. 90 Cap 1  
 amLODIPine (NORVASC) 10 mg tablet Take  by mouth daily.  benazepril-hydrochlorthiazide (LOTENSIN HCT) 10-12.5 mg per tablet Take  by mouth daily.  metoprolol succinate (TOPROL-XL) 200 mg XL tablet Take 200 mg by mouth daily.  HYDROCODONE/ACETAMINOPHEN (NORCO PO) Take  by mouth.  colchicine (COLCRYS) 0.6 mg tablet Take 0.6 mg by mouth daily.  diclofenac EC (VOLTAREN) 75 mg EC tablet Take  by mouth two (2) times a day.  atorvastatin (LIPITOR) 20 mg tablet Take  by mouth daily.  metFORMIN (GLUCOPHAGE) 500 mg tablet Take  by mouth two (2) times daily (with meals).  glipiZIDE (GLUCOTROL) 10 mg tablet Take 10 mg by mouth two (2) times a day.  esomeprazole (NEXIUM) 40 mg capsule Take  by mouth daily. Review of Systems Constitutional: Fever: No 
Skin: Rash: No 
HEENT: Hearing difficulty: No 
Eyes: Blurred vision: No 
Cardiovascular: Chest pain: No 
Respiratory: Shortness of breath: No 
 Gastrointestinal: Nausea/vomiting: No 
Musculoskeletal: Back pain: No 
Neurological: Weakness: No 
Psychological: Memory loss: No 
Comments/additional findings: PHYSICAL EXAMINATION:  
Visit Vitals /76 Ht 5' 6\" (1.676 m) Wt 218 lb (98.9 kg) BMI 35.19 kg/m² Constitutional: WDWN, Pleasant and appropriate affect, No acute distress. CV:  No peripheral swelling noted Respiratory: No respiratory distress or difficulties Abdomen:  No abdominal masses or tenderness. No CVA tenderness. No inguinal hernias noted.  Male 3/13/19:   
Thirza Clinton normal to visual inspection, no erythema or irritation, Sphincter with good tone, Rectum with no hemorrhoids, fissures or masses, Prostate smooth. Right apical nodule and left base induration. Prostate is 40 grams SCROTUM:  No scrotal rash or lesions noticed. Normal bilateral testes and epididymis. PENIS: Uncircumcised. Urethral meatus normal in location and size. No urethral discharge. Skin: No jaundice. Neuro/Psych:  Alert and oriented x 3, affect appropriate. Lymphatic:   No enlarged inguinal lymph nodes. LABS TODAY:   
Recent Results (from the past 12 hour(s)) AMB POC URINALYSIS DIP STICK AUTO W/O MICRO Collection Time: 05/01/19  2:36 PM  
Result Value Ref Range Color (UA POC) Yellow Clarity (UA POC) Clear Glucose (UA POC) Negative Negative Bilirubin (UA POC) Negative Negative Ketones (UA POC) Negative Negative Specific gravity (UA POC) 1.015 1.001 - 1.035 Blood (UA POC) 1+ Negative pH (UA POC) 6.5 4.6 - 8.0 Protein (UA POC) Negative Negative Urobilinogen (UA POC) 0.2 mg/dL 0.2 - 1 Nitrites (UA POC) Negative Negative Leukocyte esterase (UA POC) Negative Negative A copy of today's office visit with all pertinent imaging results and labs were sent to the referring physician. Yuriy Burnett MD  
[facility] [phone] Encounter Diagnoses ICD-10-CM ICD-9-CM 1. Gross hematuria R31.0 599.71  
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 Elevated PSA R97.20 790.93  
4. Abnormal prostate exam R39.89 793.5 Medical documentation provided with the assistance of Agustin Cash. Austin Hernandez, medical scribe for Shalonda Pablo MD on 5/1/2019.

## 2019-05-02 LAB
ATRIAL RATE: 67 BPM
CALCULATED P AXIS, ECG09: 63 DEGREES
CALCULATED R AXIS, ECG10: 4 DEGREES
CALCULATED T AXIS, ECG11: 17 DEGREES
DIAGNOSIS, 93000: NORMAL
P-R INTERVAL, ECG05: 152 MS
Q-T INTERVAL, ECG07: 438 MS
QRS DURATION, ECG06: 86 MS
QTC CALCULATION (BEZET), ECG08: 462 MS
VENTRICULAR RATE, ECG03: 67 BPM

## 2019-05-20 ENCOUNTER — ANESTHESIA EVENT (OUTPATIENT)
Dept: SURGERY | Age: 74
End: 2019-05-20
Payer: MEDICARE

## 2019-05-21 ENCOUNTER — ANESTHESIA (OUTPATIENT)
Dept: SURGERY | Age: 74
End: 2019-05-21
Payer: MEDICARE

## 2019-05-21 ENCOUNTER — HOSPITAL ENCOUNTER (OUTPATIENT)
Age: 74
Discharge: HOME | End: 2019-05-21
Attending: UROLOGY | Admitting: UROLOGY
Payer: MEDICARE

## 2019-05-21 ENCOUNTER — APPOINTMENT (OUTPATIENT)
Dept: GENERAL RADIOLOGY | Age: 74
End: 2019-05-21
Attending: UROLOGY
Payer: MEDICARE

## 2019-05-21 VITALS
BODY MASS INDEX: 33.56 KG/M2 | SYSTOLIC BLOOD PRESSURE: 136 MMHG | WEIGHT: 208.8 LBS | DIASTOLIC BLOOD PRESSURE: 76 MMHG | RESPIRATION RATE: 21 BRPM | TEMPERATURE: 98 F | HEIGHT: 66 IN | HEART RATE: 78 BPM | OXYGEN SATURATION: 99 %

## 2019-05-21 DIAGNOSIS — R31.0 GROSS HEMATURIA: Primary | ICD-10-CM

## 2019-05-21 LAB
GLUCOSE BLD STRIP.AUTO-MCNC: 114 MG/DL (ref 70–110)
GLUCOSE BLD STRIP.AUTO-MCNC: 127 MG/DL (ref 70–110)

## 2019-05-21 PROCEDURE — 77030020782 HC GWN BAIR PAWS FLX 3M -B: Performed by: UROLOGY

## 2019-05-21 PROCEDURE — 77030018836 HC SOL IRR NACL ICUM -A: Performed by: UROLOGY

## 2019-05-21 PROCEDURE — 77030008683 HC TU ET CUF COVD -A: Performed by: ANESTHESIOLOGY

## 2019-05-21 PROCEDURE — 77030032490 HC SLV COMPR SCD KNE COVD -B: Performed by: UROLOGY

## 2019-05-21 PROCEDURE — 82962 GLUCOSE BLOOD TEST: CPT

## 2019-05-21 PROCEDURE — 77030003481 HC NDL BIOP GUN BARD -B: Performed by: UROLOGY

## 2019-05-21 PROCEDURE — 76210000021 HC REC RM PH II 0.5 TO 1 HR: Performed by: UROLOGY

## 2019-05-21 PROCEDURE — 77030019927 HC TBNG IRR CYSTO BAXT -A: Performed by: UROLOGY

## 2019-05-21 PROCEDURE — 77030012863 HC BG URIN LEG HOLL -A: Performed by: UROLOGY

## 2019-05-21 PROCEDURE — 74011636320 HC RX REV CODE- 636/320: Performed by: UROLOGY

## 2019-05-21 PROCEDURE — 74011000250 HC RX REV CODE- 250

## 2019-05-21 PROCEDURE — G0416 PROSTATE BIOPSY, ANY MTHD: HCPCS

## 2019-05-21 PROCEDURE — 77030020268 HC MISC GENERAL SUPPLY: Performed by: UROLOGY

## 2019-05-21 PROCEDURE — 76010000162 HC OR TIME 1.5 TO 2 HR INTENSV-TIER 1: Performed by: UROLOGY

## 2019-05-21 PROCEDURE — 74011250636 HC RX REV CODE- 250/636: Performed by: UROLOGY

## 2019-05-21 PROCEDURE — 76210000006 HC OR PH I REC 0.5 TO 1 HR: Performed by: UROLOGY

## 2019-05-21 PROCEDURE — 88305 TISSUE EXAM BY PATHOLOGIST: CPT

## 2019-05-21 PROCEDURE — 74011250637 HC RX REV CODE- 250/637: Performed by: UROLOGY

## 2019-05-21 PROCEDURE — 74011250637 HC RX REV CODE- 250/637: Performed by: NURSE ANESTHETIST, CERTIFIED REGISTERED

## 2019-05-21 PROCEDURE — 76060000034 HC ANESTHESIA 1.5 TO 2 HR: Performed by: UROLOGY

## 2019-05-21 PROCEDURE — C1769 GUIDE WIRE: HCPCS | Performed by: UROLOGY

## 2019-05-21 PROCEDURE — 77030034696 HC CATH URETH FOL 2W BARD -A: Performed by: UROLOGY

## 2019-05-21 PROCEDURE — 74011250636 HC RX REV CODE- 250/636: Performed by: NURSE ANESTHETIST, CERTIFIED REGISTERED

## 2019-05-21 PROCEDURE — 74011250636 HC RX REV CODE- 250/636

## 2019-05-21 PROCEDURE — 74420 UROGRAPHY RTRGR +-KUB: CPT

## 2019-05-21 PROCEDURE — 74011000250 HC RX REV CODE- 250: Performed by: UROLOGY

## 2019-05-21 RX ORDER — KETOROLAC TROMETHAMINE 30 MG/ML
INJECTION, SOLUTION INTRAMUSCULAR; INTRAVENOUS AS NEEDED
Status: DISCONTINUED | OUTPATIENT
Start: 2019-05-21 | End: 2019-05-21 | Stop reason: HOSPADM

## 2019-05-21 RX ORDER — FENTANYL CITRATE 50 UG/ML
INJECTION, SOLUTION INTRAMUSCULAR; INTRAVENOUS AS NEEDED
Status: DISCONTINUED | OUTPATIENT
Start: 2019-05-21 | End: 2019-05-21 | Stop reason: HOSPADM

## 2019-05-21 RX ORDER — INSULIN LISPRO 100 [IU]/ML
INJECTION, SOLUTION INTRAVENOUS; SUBCUTANEOUS ONCE
Status: CANCELLED | OUTPATIENT
Start: 2019-05-21 | End: 2019-05-22

## 2019-05-21 RX ORDER — ONDANSETRON 2 MG/ML
4 INJECTION INTRAMUSCULAR; INTRAVENOUS ONCE
Status: CANCELLED | OUTPATIENT
Start: 2019-05-21 | End: 2019-05-21

## 2019-05-21 RX ORDER — OXYCODONE AND ACETAMINOPHEN 5; 325 MG/1; MG/1
1 TABLET ORAL
Qty: 20 TAB | Refills: 0 | Status: SHIPPED | OUTPATIENT
Start: 2019-05-21 | End: 2019-05-25

## 2019-05-21 RX ORDER — SODIUM CHLORIDE, SODIUM LACTATE, POTASSIUM CHLORIDE, CALCIUM CHLORIDE 600; 310; 30; 20 MG/100ML; MG/100ML; MG/100ML; MG/100ML
75 INJECTION, SOLUTION INTRAVENOUS CONTINUOUS
Status: DISCONTINUED | OUTPATIENT
Start: 2019-05-21 | End: 2019-05-21 | Stop reason: HOSPADM

## 2019-05-21 RX ORDER — ATROPA BELLADONNA AND OPIUM 16.2; 3 MG/1; MG/1
SUPPOSITORY RECTAL AS NEEDED
Status: DISCONTINUED | OUTPATIENT
Start: 2019-05-21 | End: 2019-05-21 | Stop reason: HOSPADM

## 2019-05-21 RX ORDER — PROPOFOL 10 MG/ML
INJECTION, EMULSION INTRAVENOUS AS NEEDED
Status: DISCONTINUED | OUTPATIENT
Start: 2019-05-21 | End: 2019-05-21

## 2019-05-21 RX ORDER — LIDOCAINE HYDROCHLORIDE 10 MG/ML
0.1 INJECTION, SOLUTION EPIDURAL; INFILTRATION; INTRACAUDAL; PERINEURAL AS NEEDED
Status: DISCONTINUED | OUTPATIENT
Start: 2019-05-21 | End: 2019-05-21 | Stop reason: HOSPADM

## 2019-05-21 RX ORDER — FAMOTIDINE 20 MG/1
20 TABLET, FILM COATED ORAL ONCE
Status: COMPLETED | OUTPATIENT
Start: 2019-05-21 | End: 2019-05-21

## 2019-05-21 RX ORDER — MAGNESIUM SULFATE 100 %
4 CRYSTALS MISCELLANEOUS AS NEEDED
Status: CANCELLED | OUTPATIENT
Start: 2019-05-21

## 2019-05-21 RX ORDER — SODIUM CHLORIDE, SODIUM LACTATE, POTASSIUM CHLORIDE, CALCIUM CHLORIDE 600; 310; 30; 20 MG/100ML; MG/100ML; MG/100ML; MG/100ML
50 INJECTION, SOLUTION INTRAVENOUS CONTINUOUS
Status: CANCELLED | OUTPATIENT
Start: 2019-05-21

## 2019-05-21 RX ORDER — SODIUM CHLORIDE 0.9 % (FLUSH) 0.9 %
5-40 SYRINGE (ML) INJECTION AS NEEDED
Status: DISCONTINUED | OUTPATIENT
Start: 2019-05-21 | End: 2019-05-21 | Stop reason: HOSPADM

## 2019-05-21 RX ORDER — LEVOFLOXACIN 500 MG/1
500 TABLET, FILM COATED ORAL DAILY
Qty: 5 TAB | Refills: 0 | Status: SHIPPED | OUTPATIENT
Start: 2019-05-21 | End: 2019-12-03 | Stop reason: ALTCHOICE

## 2019-05-21 RX ORDER — DIPHENHYDRAMINE HYDROCHLORIDE 50 MG/ML
12.5 INJECTION, SOLUTION INTRAMUSCULAR; INTRAVENOUS
Status: CANCELLED | OUTPATIENT
Start: 2019-05-21

## 2019-05-21 RX ORDER — SUCCINYLCHOLINE CHLORIDE 20 MG/ML
INJECTION INTRAMUSCULAR; INTRAVENOUS AS NEEDED
Status: DISCONTINUED | OUTPATIENT
Start: 2019-05-21 | End: 2019-05-21 | Stop reason: HOSPADM

## 2019-05-21 RX ORDER — HYDROMORPHONE HYDROCHLORIDE 2 MG/ML
0.5 INJECTION, SOLUTION INTRAMUSCULAR; INTRAVENOUS; SUBCUTANEOUS
Status: CANCELLED | OUTPATIENT
Start: 2019-05-21

## 2019-05-21 RX ORDER — ONDANSETRON 2 MG/ML
INJECTION INTRAMUSCULAR; INTRAVENOUS AS NEEDED
Status: DISCONTINUED | OUTPATIENT
Start: 2019-05-21 | End: 2019-05-21 | Stop reason: HOSPADM

## 2019-05-21 RX ORDER — NALOXONE HYDROCHLORIDE 0.4 MG/ML
0.04 INJECTION, SOLUTION INTRAMUSCULAR; INTRAVENOUS; SUBCUTANEOUS AS NEEDED
Status: CANCELLED | OUTPATIENT
Start: 2019-05-21

## 2019-05-21 RX ORDER — LIDOCAINE HYDROCHLORIDE 20 MG/ML
INJECTION, SOLUTION EPIDURAL; INFILTRATION; INTRACAUDAL; PERINEURAL AS NEEDED
Status: DISCONTINUED | OUTPATIENT
Start: 2019-05-21 | End: 2019-05-21 | Stop reason: HOSPADM

## 2019-05-21 RX ORDER — DEXTROSE 50 % IN WATER (D50W) INTRAVENOUS SYRINGE
25-50 AS NEEDED
Status: CANCELLED | OUTPATIENT
Start: 2019-05-21

## 2019-05-21 RX ORDER — ALBUTEROL SULFATE 0.83 MG/ML
2.5 SOLUTION RESPIRATORY (INHALATION) AS NEEDED
Status: CANCELLED | OUTPATIENT
Start: 2019-05-21

## 2019-05-21 RX ORDER — LEVOFLOXACIN 5 MG/ML
500 INJECTION, SOLUTION INTRAVENOUS ONCE
Status: DISCONTINUED | OUTPATIENT
Start: 2019-05-21 | End: 2019-05-21 | Stop reason: HOSPADM

## 2019-05-21 RX ORDER — SODIUM CHLORIDE 0.9 % (FLUSH) 0.9 %
5-40 SYRINGE (ML) INJECTION EVERY 8 HOURS
Status: DISCONTINUED | OUTPATIENT
Start: 2019-05-21 | End: 2019-05-21 | Stop reason: HOSPADM

## 2019-05-21 RX ORDER — ROCURONIUM BROMIDE 10 MG/ML
INJECTION, SOLUTION INTRAVENOUS AS NEEDED
Status: DISCONTINUED | OUTPATIENT
Start: 2019-05-21 | End: 2019-05-21 | Stop reason: HOSPADM

## 2019-05-21 RX ORDER — FUROSEMIDE 10 MG/ML
INJECTION INTRAMUSCULAR; INTRAVENOUS AS NEEDED
Status: DISCONTINUED | OUTPATIENT
Start: 2019-05-21 | End: 2019-05-21 | Stop reason: HOSPADM

## 2019-05-21 RX ORDER — POLYETHYLENE GLYCOL 3350 17 G/17G
17 POWDER, FOR SOLUTION ORAL DAILY
Qty: 200 G | Refills: 0 | Status: SHIPPED | OUTPATIENT
Start: 2019-05-21

## 2019-05-21 RX ORDER — MIDAZOLAM HYDROCHLORIDE 1 MG/ML
INJECTION, SOLUTION INTRAMUSCULAR; INTRAVENOUS AS NEEDED
Status: DISCONTINUED | OUTPATIENT
Start: 2019-05-21 | End: 2019-05-21 | Stop reason: HOSPADM

## 2019-05-21 RX ORDER — DEXAMETHASONE SODIUM PHOSPHATE 4 MG/ML
INJECTION, SOLUTION INTRA-ARTICULAR; INTRALESIONAL; INTRAMUSCULAR; INTRAVENOUS; SOFT TISSUE AS NEEDED
Status: DISCONTINUED | OUTPATIENT
Start: 2019-05-21 | End: 2019-05-21 | Stop reason: HOSPADM

## 2019-05-21 RX ORDER — GLYCOPYRROLATE 0.2 MG/ML
INJECTION INTRAMUSCULAR; INTRAVENOUS AS NEEDED
Status: DISCONTINUED | OUTPATIENT
Start: 2019-05-21 | End: 2019-05-21 | Stop reason: HOSPADM

## 2019-05-21 RX ORDER — SODIUM CHLORIDE 9 MG/ML
INJECTION, SOLUTION INTRAVENOUS
Status: DISCONTINUED | OUTPATIENT
Start: 2019-05-21 | End: 2019-05-21 | Stop reason: HOSPADM

## 2019-05-21 RX ORDER — INSULIN LISPRO 100 [IU]/ML
INJECTION, SOLUTION INTRAVENOUS; SUBCUTANEOUS ONCE
Status: DISCONTINUED | OUTPATIENT
Start: 2019-05-21 | End: 2019-05-21 | Stop reason: HOSPADM

## 2019-05-21 RX ADMIN — WATER 2 G: 1 INJECTION INTRAMUSCULAR; INTRAVENOUS; SUBCUTANEOUS at 15:18

## 2019-05-21 RX ADMIN — FENTANYL CITRATE 50 MCG: 50 INJECTION, SOLUTION INTRAMUSCULAR; INTRAVENOUS at 16:54

## 2019-05-21 RX ADMIN — ONDANSETRON 4 MG: 2 INJECTION INTRAMUSCULAR; INTRAVENOUS at 15:18

## 2019-05-21 RX ADMIN — SODIUM CHLORIDE: 9 INJECTION, SOLUTION INTRAVENOUS at 15:18

## 2019-05-21 RX ADMIN — GLYCOPYRROLATE 0.2 MG: 0.2 INJECTION INTRAMUSCULAR; INTRAVENOUS at 15:18

## 2019-05-21 RX ADMIN — FENTANYL CITRATE 100 MCG: 50 INJECTION, SOLUTION INTRAMUSCULAR; INTRAVENOUS at 15:24

## 2019-05-21 RX ADMIN — FENTANYL CITRATE 50 MCG: 50 INJECTION, SOLUTION INTRAMUSCULAR; INTRAVENOUS at 16:43

## 2019-05-21 RX ADMIN — MIDAZOLAM HYDROCHLORIDE 2 MG: 1 INJECTION, SOLUTION INTRAMUSCULAR; INTRAVENOUS at 15:18

## 2019-05-21 RX ADMIN — PROPOFOL 170 MG: 10 INJECTION, EMULSION INTRAVENOUS at 15:24

## 2019-05-21 RX ADMIN — ROCURONIUM BROMIDE 5 MG: 10 INJECTION, SOLUTION INTRAVENOUS at 15:24

## 2019-05-21 RX ADMIN — FAMOTIDINE 20 MG: 20 TABLET ORAL at 11:24

## 2019-05-21 RX ADMIN — FENTANYL CITRATE 50 MCG: 50 INJECTION, SOLUTION INTRAMUSCULAR; INTRAVENOUS at 15:44

## 2019-05-21 RX ADMIN — FUROSEMIDE 20 MG: 10 INJECTION INTRAMUSCULAR; INTRAVENOUS at 16:39

## 2019-05-21 RX ADMIN — KETOROLAC TROMETHAMINE 15 MG: 30 INJECTION, SOLUTION INTRAMUSCULAR; INTRAVENOUS at 16:56

## 2019-05-21 RX ADMIN — SUCCINYLCHOLINE CHLORIDE 140 MG: 20 INJECTION INTRAMUSCULAR; INTRAVENOUS at 15:24

## 2019-05-21 RX ADMIN — SODIUM CHLORIDE: 9 INJECTION, SOLUTION INTRAVENOUS at 16:00

## 2019-05-21 RX ADMIN — LIDOCAINE HYDROCHLORIDE 85 MG: 20 INJECTION, SOLUTION EPIDURAL; INFILTRATION; INTRACAUDAL; PERINEURAL at 15:24

## 2019-05-21 RX ADMIN — FENTANYL CITRATE 50 MCG: 50 INJECTION, SOLUTION INTRAMUSCULAR; INTRAVENOUS at 15:58

## 2019-05-21 RX ADMIN — SODIUM CHLORIDE, SODIUM LACTATE, POTASSIUM CHLORIDE, AND CALCIUM CHLORIDE 75 ML/HR: 600; 310; 30; 20 INJECTION, SOLUTION INTRAVENOUS at 11:24

## 2019-05-21 RX ADMIN — DEXAMETHASONE SODIUM PHOSPHATE 4 MG: 4 INJECTION, SOLUTION INTRA-ARTICULAR; INTRALESIONAL; INTRAMUSCULAR; INTRAVENOUS; SOFT TISSUE at 15:24

## 2019-05-21 RX ADMIN — FENTANYL CITRATE 50 MCG: 50 INJECTION, SOLUTION INTRAMUSCULAR; INTRAVENOUS at 16:08

## 2019-05-21 NOTE — INTERVAL H&P NOTE
H&P Update: 
[facility] was seen and examined. History and physical has been reviewed. The patient has been examined.  There have been no significant clinical changes since the completion of the originally dated History and Physical.

## 2019-05-21 NOTE — ANESTHESIA POSTPROCEDURE EVALUATION
Procedure(s):  CYSTOSCOPY TRANSURETHRAL RESECTION OF PROSTATE  TRANSRECTAL ULTRASOUND GUIDED PROSTATE BIOPSY/FORTEC  CYSTOSCOPY WITH RETROGRADES URETEROSCOPY. general    Anesthesia Post Evaluation      Multimodal analgesia: multimodal analgesia used between 6 hours prior to anesthesia start to PACU discharge  Patient location during evaluation: bedside  Patient participation: complete - patient participated  Level of consciousness: awake  Pain management: adequate  Airway patency: patent  Anesthetic complications: no  Cardiovascular status: stable  Respiratory status: acceptable  Hydration status: acceptable  Post anesthesia nausea and vomiting:  controlled      Vitals Value Taken Time   /71 5/21/2019  5:42 PM   Temp 37.1 °C (98.7 °F) 5/21/2019  5:13 PM   Pulse 79 5/21/2019  5:46 PM   Resp 21 5/21/2019  5:46 PM   SpO2 97 % 5/21/2019  5:46 PM   Vitals shown include unvalidated device data.

## 2019-05-21 NOTE — PERIOP NOTES
Patient discharged from facility via wheelchair. Patient's daughter has discharge instructions and prescriptions in hand. Patient armband removed and shredded.

## 2019-05-21 NOTE — DISCHARGE INSTRUCTIONS
Discharge Instructions      Patient: Sofya Soares               Sex: male          DOA: 5/21/2019  YOB: 1945      Age:  68 y.o. ACUTE DIAGNOSES:  Hematuria, urinary retention, tumor of prostate     ACTIVITY:   You are restricted from lifting greater than 10 pounds for 1 week from the date of surgery until the urine is consistently clear. You may resume driving once able to perform the activities of driving without pain. You may travel by airplane or ground transportation after discharge. A short walk is recommended at least once every hour during long journeys. Avoid sexual intercourse for 2 weeks from the date of surgery. It is common to experience bleeding with ejaculation during the healing period. Avoid activities which place pressure in the pelvic area such as bicycling for 4 weeks from the date of surgery. CATHETER:  Indwelling catheter care:  1. Maintain sterile, closed gravity drainage system:          a. Secure the catheter to upper thigh or abdomen to avoid urethral irritation and contamination. b. Empty the catheter bag as needed. c. Do not routinely irrigate the catheter. d. Do not clamp or kink the drainage tubing, and keep the collection bag below bladder level at all times. 2.     If urine leaks around the catheter in the absence of obstruction, it is likely due to a bladder spasm. ADDITIONAL INFORMATION:     - If you experience any fevers > 100.4, significant nausea / vomiting, or significant worsening of pain, please contact us at the number below. - It is common to experience recurrent blood in your urine for several months after surgery. Although there should be a general trend of decreasing bleeding over time, it is not uncommon for bleeding to recur after periods of no bleeding. If you notice passage of clots, you should increase your liquid intake in order to reduce the number of clots encountered. If the bleeding continues to worsen with inability to pass clots, inability to urinate, or you experience significant light-headedness, please contact us at the number above. - Burning with urination, or dysuria, is common after this procedure. This may occur at any time of the urinary stream.  This will continue to improve over time. - It is common to temporary urinary leakage after this procedure. This will continue to improve over time. You may additionally perform Kegel exercises to help build the muscles at the base of the bladder. FOLLOW UP CARE:  You have an appointment tomorrow in the clinic on Thursday for Catheter removal.     Following this you will have an appointment in 2 weeks with Dr. Germán Alonso to discuss pathology results. PLEASE REMEMBER TO GET CT SCAN PRIOR TO APPT WITH DR. George Griffith       DISCHARGE SUMMARY from Nurse    PATIENT INSTRUCTIONS:    After general anesthesia or intravenous sedation, for 24 hours or while taking prescription Narcotics:  · Limit your activities  · Do not drive and operate hazardous machinery  · Do not make important personal or business decisions  · Do  not drink alcoholic beverages  · If you have not urinated within 8 hours after discharge, please contact your surgeon on call. Report the following to your surgeon:  · Excessive pain, swelling, redness or odor of or around the surgical area  · Temperature over 100.5  · Nausea and vomiting lasting longer than 4 hours or if unable to take medications  · Any signs of decreased circulation or nerve impairment to extremity: change in color, persistent  numbness, tingling, coldness or increase pain  · Any questions    What to do at Home:  Recommended activity: Activity as tolerated and no driving for today and No driving while on analgesics. *  Please give a list of your current medications to your Primary Care Provider.     *  Please update this list whenever your medications are discontinued, doses are changed, or new medications (including over-the-counter products) are added. *  Please carry medication information at all times in case of emergency situations. These are general instructions for a healthy lifestyle:    No smoking/ No tobacco products/ Avoid exposure to second hand smoke  Surgeon General's Warning:  Quitting smoking now greatly reduces serious risk to your health. Obesity, smoking, and sedentary lifestyle greatly increases your risk for illness    A healthy diet, regular physical exercise & weight monitoring are important for maintaining a healthy lifestyle    You may be retaining fluid if you have a history of heart failure or if you experience any of the following symptoms:  Weight gain of 3 pounds or more overnight or 5 pounds in a week, increased swelling in our hands or feet or shortness of breath while lying flat in bed. Please call your doctor as soon as you notice any of these symptoms; do not wait until your next office visit. Recognize signs and symptoms of STROKE:    F-face looks uneven    A-arms unable to move or move unevenly    S-speech slurred or non-existent    T-time-call 911 as soon as signs and symptoms begin-DO NOT go       Back to bed or wait to see if you get better-TIME IS BRAIN. Warning Signs of HEART ATTACK     Call 911 if you have these symptoms:   Chest discomfort. Most heart attacks involve discomfort in the center of the chest that lasts more than a few minutes, or that goes away and comes back. It can feel like uncomfortable pressure, squeezing, fullness, or pain.  Discomfort in other areas of the upper body. Symptoms can include pain or discomfort in one or both arms, the back, neck, jaw, or stomach.  Shortness of breath with or without chest discomfort.  Other signs may include breaking out in a cold sweat, nausea, or lightheadedness. Don't wait more than five minutes to call 911 - MINUTES MATTER! Fast action can save your life.  Calling 911 is almost always the fastest way to get lifesaving treatment. Emergency Medical Services staff can begin treatment when they arrive -- up to an hour sooner than if someone gets to the hospital by car. The discharge information has been reviewed with the patient and spouse. The patient and spouse verbalized understanding. Discharge medications reviewed with the patient and spouse and appropriate educational materials and side effects teaching were provided. ___________________________________________________________________________________________________________________________________    Patient Education   Polyethylene Glycol 3350 (By mouth)   Polyethylene Glycol (jal-ow-COP-i-wilda GLYE-kol)  Treats occasional constipation. Brand Name(s): Equate ClearLax, Joeline Dole, [facility] Clear Lax, Healthylax Polyethylene Glycol 3350, Leader ClearLax, MiraLAX, , Purelax, [employer] Laxative, [employer] Clearmax, [employer] ClearLax, [facility] Polyethylene Glycol 3350   There may be other brand names for this medicine. When This Medicine Should Not Be Used: You should not use this medicine if you have had an allergic reaction to polyethylene glycol, or if you have signs of a bowel obstruction (nausea, vomiting, stomach pain or bloating). How to Use This Medicine:   Powder, Liquid, Packet  · Your doctor will tell you how much of this medicine to take and how often. Do not take more medicine or take it more often than your doctor tells you to. This medicine is not for long-term use. · Always dissolve the powder in a full glass (8 ounces) of water, juice, soda, coffee, or tea before swallowing it. If you need to measure your medicine, use a marked measuring cup or spoon. · Measure the oral liquid medicine with a marked measuring spoon, oral syringe, or medicine cup. · It may take 2 days or longer for this medicine to help you have a bowel movement.   If a dose is missed:   · If you miss a dose or forget to take your medicine, take it as soon as you can. If it is almost time for your next dose, wait until then to take the medicine and skip the missed dose. · Do not use extra medicine to make up for a missed dose. How to Store and Dispose of This Medicine:   · Store the medicine at room temperature in a closed container, away from heat, moisture, and direct light. · Keep all medicine out of the reach of children and never share your medicine with anyone. Drugs and Foods to Avoid:      Ask your doctor or pharmacist before using any other medicine, including over-the-counter medicines, vitamins, and herbal products. Warnings While Using This Medicine:   · Make sure your doctor knows if you are pregnant or breastfeeding. · Do not use this medicine longer than 2 weeks unless your doctor has told you to. Possible Side Effects While Using This Medicine:   Call your doctor right away if you notice any of these side effects:  · Allergic reaction: Itching or hives, swelling in face or hands, swelling or tingling in the mouth or throat, tightness in chest, trouble breathing. · Severe stomach pain, bloating, vomiting, or diarrhea. If you notice these less serious side effects, talk with your doctor:   · Mild cramps, bloating, diarrhea, or gas. If you notice other side effects that you think are caused by this medicine, tell your doctor. Call your doctor for medical advice about side effects. You may report side effects to FDA at 2-553-FDA-9270  © 2017 [facility] Information is for End User's use only and may not be sold, redistributed or otherwise used for commercial purposes. The above information is an  only. It is not intended as medical advice for individual conditions or treatments. Talk to your doctor, nurse or pharmacist before following any medical regimen to see if it is safe and effective for you.        Patient Education   Levofloxacin (By mouth)   Levofloxacin (jra-zsr-PHTH-a-sin)  Treats infections. This medicine is a quinolone antibiotic. Brand Name(s): Levaquin   There may be other brand names for this medicine. When This Medicine Should Not Be Used: This medicine is not right for everyone. Do not use it if you had an allergic reaction to levofloxacin or to similar medicines. How to Use This Medicine:   Liquid, Tablet  · Your doctor will tell you how much medicine to use. Do not use more than directed. Take your medicine at the same time each day. · Tablet: Take it with or without food. · Liquid: Take it 1 hour before or 2 hours after you eat. Measure the oral liquid medicine with a marked measuring spoon, oral syringe, or medicine cup. · Take all of the medicine in your prescription to clear up your infection, even if you feel better after the first few doses. · Drink extra fluids so you will urinate more often and help prevent kidney problems. · This medicine should come with a Medication Guide. Ask your pharmacist for a copy if you do not have one. · Missed dose: Take a dose as soon as you remember. If it is almost time for your next dose, wait until then and take a regular dose. Do not take extra medicine to make up for a missed dose. · Store the medicine in a closed container at room temperature, away from heat, moisture, and direct light. Drugs and Foods to Avoid:   Ask your doctor or pharmacist before using any other medicine, including over-the-counter medicines, vitamins, and herbal products. · Some foods and medicines can affect how levofloxacin works.  Tell your doctor if you are using any of the following:  ¨ Theophylline  ¨ Blood thinner (including warfarin)  ¨ Diabetes medicine  ¨ Medicine for heart rhythm problems (including amiodarone, procainamide, quinidine, sotalol)  ¨ NSAID pain or arthritis medicine (including aspirin, celecoxib, diclofenac, ibuprofen, naproxen)  ¨ Steroid medicine (including hydrocortisone, methylprednisolone, prednisone)  · Take levofloxacin at least 2 hours before or 2 hours after you take antacids that contain magnesium or aluminum, zinc, or iron supplements, sucralfate, or didanosine. Warnings While Using This Medicine:   · Tell your doctor if you are pregnant or breastfeeding, or if you have kidney disease, liver disease, diabetes, heart disease, myasthenia gravis, or a history of heart rhythm problems (such as QT prolongation) or seizures. Tell your doctor if you have ever had tendon or joint problems, including rheumatoid arthritis, or if you have received a transplant. · This medicine may cause the following problems:  ¨ Tendinitis and tendon rupture (may happen after treatment ends)  ¨ Liver damage  ¨ Nerve damage in the arms or legs  ¨ Heart rhythm changes  ¨ Changes in blood sugar levels  · This medicine may make you feel dizzy or lightheaded. Do not drive or do anything else that could be dangerous until you know how this medicine affects you. · This medicine can cause diarrhea. Call your doctor if the diarrhea becomes severe, does not stop, or is bloody. Do not take any medicine to stop diarrhea until you have talked to your doctor. Diarrhea can occur 2 months or more after you stop taking this medicine. · Tell any doctor or dentist who treats you that you are using this medicine. This medicine may affect certain medical test results. · This medicine may make your skin more sensitive to sunlight. Wear sunscreen. Do not use sunlamps or tanning beds. · Call your doctor if your symptoms do not improve or if they get worse. · Keep all medicine out of the reach of children. Never share your medicine with anyone.   Possible Side Effects While Using This Medicine:   Call your doctor right away if you notice any of these side effects:  · Allergic reaction: Itching or hives, swelling in your face or hands, swelling or tingling in your mouth or throat, chest tightness, trouble breathing  · Blistering, peeling, red skin rash  · Change in how much or how often you urinate  · Dark urine or pale stools, nausea, vomiting, loss of appetite, stomach pain, yellow skin or eyes  · Diarrhea that may contain blood  · Fainting, dizziness, or lightheadedness  · Fast, slow, or uneven heartbeat, chest pain  · Numbness, tingling, or burning pain in your hands, arms, legs, or feet  · Pain, stiffness, swelling, or bruises around your ankle, leg, shoulder, or other joint  · Seizures, severe headache, unusual thoughts or behaviors, trouble sleeping, feeling anxious, confused, or depressed, seeing, hearing, or feeling things that are not there  · Unusual bleeding, bruising, or weakness  If you notice these less serious side effects, talk with your doctor:   · Mild headache or nausea  If you notice other side effects that you think are caused by this medicine, tell your doctor. Call your doctor for medical advice about side effects. You may report side effects to FDA at 6-070-FDA-8438  © 2017 [facility] [place] Information is for End User's use only and may not be sold, redistributed or otherwise used for commercial purposes. The above information is an  only. It is not intended as medical advice for individual conditions or treatments. Talk to your doctor, nurse or pharmacist before following any medical regimen to see if it is safe and effective for you. Patient Education      Narcotic-Analgesic/Acetaminophen (Percocet, [address] [facility], [facility], Lortab 10/325) - (By mouth)   Why this medicine is used:   Relieves pain.   Contact a nurse or doctor right away if you have:  · Extreme weakness, shallow breathing, slow heartbeat  · Severe confusion, lightheadedness, dizziness, fainting  · Yellow skin or eyes, dark urine or pale stools  · Severe constipation, severe stomach pain, nausea, vomiting, loss of appetite  · Sweating or cold, clammy skin     Common side effects:  · Mild constipation, nausea, vomiting  · Sleepiness, tiredness  · Itching, rash  © 2017 [facility] [place] Information is for End User's use only and may not be sold, redistributed or otherwise used for commercial purposes.

## 2019-05-21 NOTE — OP NOTES
Operative Note  Patient: Estrella Gomez               Sex: male             MRN: [id number]  YOB: 1945      Age:  68 y.o. 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 Preop culture was negative. Procedure:    1) Cystoscopy  2) Bilateral retrograde pyelograms   3) Transurethral resection of prostate  4) Transrectal ultrasound  5) Transrectal ultrasound guided prostate biopsy     Findings:    1). Cystoscopy revealed papillary tumor carpeting the prostatic urethra   2). Retrograde pyelograms without hydronephrosis or filling defects   3). Transurethral resection of prostate with good hemostasis at conclusion   4). Ultrasound revealed apical hypoechoic lesion consistent with abnormal DANIELLE location and 39cc prostate   5). Standard 12 core biopsy performed     Procedure in Detail: The patient was brought to the operative suite. Anesthesia was induced and preoperative antibiotics were administered. They were then placed in the dorsal lithotomy position and their external genitalia was prepped and draped in the usual fashion. A surgical timeout was performed confirming the patient's name, date of birth, laterality, and antibiotics. All were in agreement. A 22 Portuguese cystourethroscope was then inserted into the patients bladder. The urethra revealed no abnormalities. Prostate was carpeted with friable, bleeding papillary appearing tumor. There also appeared to be a high riding bladder neck and bilobar hypertrophy.   Thorough cystoscopy was performed with both the 30 degree and 70 degree lens which revealed no abnormalities in the bladder. Bilateral retrogrades were performed which revealed no filling defects or hydronephrosis. The urethra was then dilated with Aggie Anyi to 28 Fr and the resectoscope was inserted. Bipolar resection was performed the prostate with a nice wide open channel at conclusion. Rollerball was used for hemostasis after the chips were removed. A 22 Fr valdez was then placed into the bladder and irrigated clear. Once the sterile portion was concluded. Transrectal ultrasound was performed. This revealed a 37cc prostate with hypoechoic abnormality near the left apex consistent with abnormality on DANIELLE. 12 core biopsies were performed. Hemostasis was excellent. The patient was then awoken and transferred to the recovery room in stable condition. Estimated Blood Loss: 10cc     Anesthesia:  General                  Implants: * No implants in log *    Specimens:   ID Type Source Tests Collected by Time Destination   1 : Prostate Chips Preservative Prostate  Kellee Laguerre MD 5/21/2019  4:29 PM Pathology   2 :  Prostate biopsy Preservative Prostate  Kellee Laguerre MD 5/21/2019  4:39 PM Pathology        Drains: 25 Fr Valdez catheter with 30cc in balloon            Complications:  None           Plan:  1. Levaquin x 5 days  2. Return on Thursday 5/23 for nursing visit for Void trial   3. Obtain CT Urogram between now and visit in 2 weeks (3rd attempt to get patient to obtain CT scan)   4.  Return in 2 weeks to discuss pathology     Fiona Pulido MD  5/21/2019

## 2019-05-21 NOTE — ANESTHESIA PREPROCEDURE EVALUATION
Relevant Problems   No relevant active problems       Anesthetic History   No history of anesthetic complications            Review of Systems / Medical History  Patient summary reviewed, nursing notes reviewed and pertinent labs reviewed    Pulmonary  Within defined limits                 Neuro/Psych   Within defined limits           Cardiovascular    Hypertension          Hyperlipidemia    Exercise tolerance: >4 METS     GI/Hepatic/Renal     GERD           Endo/Other    Diabetes    Arthritis     Other Findings   Comments: gout           Physical Exam    Airway  Mallampati: II  TM Distance: 4 - 6 cm  Neck ROM: normal range of motion   Mouth opening: Normal     Cardiovascular  Regular rate and rhythm,  S1 and S2 normal,  no murmur, click, rub, or gallop  Rhythm: regular  Rate: normal         Dental  No notable dental hx       Pulmonary  Breath sounds clear to auscultation               Abdominal  GI exam deferred       Other Findings            Anesthetic Plan    ASA: 3  Anesthesia type: general          Induction: Intravenous  Anesthetic plan and risks discussed with: Patient

## 2019-05-28 NOTE — PROGRESS NOTES
I talked to him about Pathology results. Can you make sure he has a CT Urogram (may already be scheduled) and a Bone scan COMPLETED before he sees me. Thanks. Can push the appt back a bit.   Thanks

## 2019-09-20 ENCOUNTER — HOSPITAL ENCOUNTER (OUTPATIENT)
Dept: RADIATION THERAPY | Age: 74
Discharge: HOME | End: 2019-09-20
Payer: MEDICARE

## 2019-09-20 PROCEDURE — 99211 OFF/OP EST MAY X REQ PHY/QHP: CPT

## 2019-09-27 ENCOUNTER — HOSPITAL ENCOUNTER (OUTPATIENT)
Dept: MRI IMAGING | Age: 74
Discharge: HOME | End: 2019-09-27
Attending: RADIOLOGY
Payer: MEDICARE

## 2019-09-27 VITALS — BODY MASS INDEX: 35.51 KG/M2 | WEIGHT: 220 LBS

## 2019-09-27 DIAGNOSIS — C61 PROSTATE CA (HCC): ICD-10-CM

## 2019-09-27 LAB — CREAT UR-MCNC: 0.9 MG/DL (ref 0.6–1.3)

## 2019-09-27 PROCEDURE — 74011250636 HC RX REV CODE- 250/636: Performed by: RADIOLOGY

## 2019-09-27 PROCEDURE — 72197 MRI PELVIS W/O & W/DYE: CPT

## 2019-09-27 PROCEDURE — A9575 INJ GADOTERATE MEGLUMI 0.1ML: HCPCS | Performed by: RADIOLOGY

## 2019-09-27 PROCEDURE — 82565 ASSAY OF CREATININE: CPT

## 2019-09-27 RX ORDER — GADOTERATE MEGLUMINE 376.9 MG/ML
20 INJECTION INTRAVENOUS
Status: COMPLETED | OUTPATIENT
Start: 2019-09-27 | End: 2019-09-27

## 2019-09-27 RX ADMIN — GADOTERATE MEGLUMINE 20 ML: 376.9 INJECTION INTRAVENOUS at 15:52

## 2019-11-05 ENCOUNTER — HOSPITAL ENCOUNTER (OUTPATIENT)
Dept: RADIATION THERAPY | Age: 74
Discharge: HOME | End: 2019-11-05

## 2019-11-05 ENCOUNTER — HOSPITAL ENCOUNTER (OUTPATIENT)
Dept: LAB | Age: 74
Discharge: HOME | End: 2019-11-05
Payer: MEDICARE

## 2019-11-05 DIAGNOSIS — C61 MALIGNANT NEOPLASM OF PROSTATE (HCC): ICD-10-CM

## 2019-11-05 LAB
ANION GAP SERPL CALC-SCNC: 9 MMOL/L (ref 3–18)
APPEARANCE UR: CLEAR
ATRIAL RATE: 58 BPM
BASOPHILS # BLD: 0 K/UL (ref 0–0.1)
BASOPHILS NFR BLD: 0 % (ref 0–2)
BILIRUB UR QL: NEGATIVE
BUN SERPL-MCNC: 20 MG/DL (ref 7–18)
BUN/CREAT SERPL: 19 (ref 12–20)
CALCIUM SERPL-MCNC: 10.5 MG/DL (ref 8.5–10.1)
CALCULATED P AXIS, ECG09: 45 DEGREES
CALCULATED R AXIS, ECG10: 34 DEGREES
CALCULATED T AXIS, ECG11: 22 DEGREES
CHLORIDE SERPL-SCNC: 106 MMOL/L (ref 100–111)
CO2 SERPL-SCNC: 26 MMOL/L (ref 21–32)
COLOR UR: YELLOW
CREAT SERPL-MCNC: 1.08 MG/DL (ref 0.6–1.3)
DIAGNOSIS, 93000: NORMAL
DIFFERENTIAL METHOD BLD: NORMAL
EOSINOPHIL # BLD: 0.2 K/UL (ref 0–0.4)
EOSINOPHIL NFR BLD: 3 % (ref 0–5)
ERYTHROCYTE [DISTWIDTH] IN BLOOD BY AUTOMATED COUNT: 13.7 % (ref 11.6–14.5)
GLUCOSE SERPL-MCNC: 123 MG/DL (ref 74–99)
GLUCOSE UR STRIP.AUTO-MCNC: NEGATIVE MG/DL
HCT VFR BLD AUTO: 42.4 % (ref 36–48)
HGB BLD-MCNC: 14.3 G/DL (ref 13–16)
HGB UR QL STRIP: NEGATIVE
KETONES UR QL STRIP.AUTO: NEGATIVE MG/DL
LEUKOCYTE ESTERASE UR QL STRIP.AUTO: NEGATIVE
LYMPHOCYTES # BLD: 1.3 K/UL (ref 0.9–3.6)
LYMPHOCYTES NFR BLD: 22 % (ref 21–52)
MCH RBC QN AUTO: 29.9 PG (ref 24–34)
MCHC RBC AUTO-ENTMCNC: 33.7 G/DL (ref 31–37)
MCV RBC AUTO: 88.5 FL (ref 74–97)
MONOCYTES # BLD: 0.3 K/UL (ref 0.05–1.2)
MONOCYTES NFR BLD: 5 % (ref 3–10)
NEUTS SEG # BLD: 4.2 K/UL (ref 1.8–8)
NEUTS SEG NFR BLD: 70 % (ref 40–73)
NITRITE UR QL STRIP.AUTO: NEGATIVE
P-R INTERVAL, ECG05: 124 MS
PH UR STRIP: 5.5 [PH] (ref 5–8)
PLATELET # BLD AUTO: 226 K/UL (ref 135–420)
PMV BLD AUTO: 11.1 FL (ref 9.2–11.8)
POTASSIUM SERPL-SCNC: 3.7 MMOL/L (ref 3.5–5.5)
PROT UR STRIP-MCNC: NEGATIVE MG/DL
Q-T INTERVAL, ECG07: 426 MS
QRS DURATION, ECG06: 90 MS
QTC CALCULATION (BEZET), ECG08: 418 MS
RBC # BLD AUTO: 4.79 M/UL (ref 4.7–5.5)
SODIUM SERPL-SCNC: 141 MMOL/L (ref 136–145)
SP GR UR REFRACTOMETRY: 1.02 (ref 1–1.03)
UROBILINOGEN UR QL STRIP.AUTO: 1 EU/DL (ref 0.2–1)
VENTRICULAR RATE, ECG03: 58 BPM
WBC # BLD AUTO: 5.9 K/UL (ref 4.6–13.2)

## 2019-11-05 PROCEDURE — 85025 COMPLETE CBC W/AUTO DIFF WBC: CPT

## 2019-11-05 PROCEDURE — 84153 ASSAY OF PSA TOTAL: CPT

## 2019-11-05 PROCEDURE — 81003 URINALYSIS AUTO W/O SCOPE: CPT

## 2019-11-05 PROCEDURE — 80048 BASIC METABOLIC PNL TOTAL CA: CPT

## 2019-11-05 PROCEDURE — 36415 COLL VENOUS BLD VENIPUNCTURE: CPT

## 2019-11-05 PROCEDURE — 87086 URINE CULTURE/COLONY COUNT: CPT

## 2019-11-05 PROCEDURE — 93005 ELECTROCARDIOGRAM TRACING: CPT

## 2019-11-05 PROCEDURE — 84403 ASSAY OF TOTAL TESTOSTERONE: CPT

## 2019-11-06 LAB
BACTERIA SPEC CULT: NORMAL
PSA SERPL-MCNC: 0.1 NG/ML (ref 0–4)
SERVICE CMNT-IMP: NORMAL
TESTOST SERPL-MCNC: 23 NG/DL (ref 264–916)

## 2019-11-18 ENCOUNTER — HOSPITAL ENCOUNTER (OUTPATIENT)
Dept: RADIATION THERAPY | Age: 74
Discharge: HOME | End: 2019-11-18
Payer: MEDICARE

## 2019-11-18 ENCOUNTER — ANESTHESIA EVENT (OUTPATIENT)
Dept: RADIATION THERAPY | Age: 74
End: 2019-11-18
Payer: MEDICARE

## 2019-11-18 ENCOUNTER — ANESTHESIA (OUTPATIENT)
Dept: RADIATION THERAPY | Age: 74
End: 2019-11-18
Payer: MEDICARE

## 2019-11-18 VITALS
OXYGEN SATURATION: 96 % | HEART RATE: 60 BPM | HEIGHT: 66 IN | WEIGHT: 218.19 LBS | BODY MASS INDEX: 35.07 KG/M2 | TEMPERATURE: 97.6 F | SYSTOLIC BLOOD PRESSURE: 130 MMHG | RESPIRATION RATE: 13 BRPM | DIASTOLIC BLOOD PRESSURE: 75 MMHG

## 2019-11-18 LAB
GLUCOSE BLD STRIP.AUTO-MCNC: 138 MG/DL (ref 70–110)
GLUCOSE BLD STRIP.AUTO-MCNC: 165 MG/DL (ref 70–110)

## 2019-11-18 PROCEDURE — 77030012510 HC MSK AIRWY LMA TELE -B: Performed by: RADIOLOGY

## 2019-11-18 PROCEDURE — 77030034850

## 2019-11-18 PROCEDURE — 74011250636 HC RX REV CODE- 250/636: Performed by: RADIOLOGY

## 2019-11-18 PROCEDURE — 74011250636 HC RX REV CODE- 250/636: Performed by: NURSE ANESTHETIST, CERTIFIED REGISTERED

## 2019-11-18 PROCEDURE — C2643 BRACHYTX, NON-STRANDED,C-131: HCPCS

## 2019-11-18 PROCEDURE — 51798 US URINE CAPACITY MEASURE: CPT

## 2019-11-18 PROCEDURE — 82962 GLUCOSE BLOOD TEST: CPT

## 2019-11-18 PROCEDURE — 77030013079 HC BLNKT BAIR HGGR 3M -A: Performed by: RADIOLOGY

## 2019-11-18 PROCEDURE — 77332 RADIATION TREATMENT AID(S): CPT

## 2019-11-18 PROCEDURE — 76060000033 HC ANESTHESIA 1 TO 1.5 HR

## 2019-11-18 PROCEDURE — 77030018846 HC SOL IRR STRL H20 ICUM -A

## 2019-11-18 PROCEDURE — C2642 BRACHYTX, STRANDED, C-131: HCPCS

## 2019-11-18 PROCEDURE — 74011000250 HC RX REV CODE- 250: Performed by: NURSE ANESTHETIST, CERTIFIED REGISTERED

## 2019-11-18 PROCEDURE — 74011250637 HC RX REV CODE- 250/637: Performed by: RADIOLOGY

## 2019-11-18 PROCEDURE — 77030032490 HC SLV COMPR SCD KNE COVD -B

## 2019-11-18 PROCEDURE — 77778 APPLY INTERSTIT RADIAT COMPL: CPT

## 2019-11-18 PROCEDURE — 77030015736 HC BLLN ENDOCAV CIVC -B

## 2019-11-18 PROCEDURE — 74011636320 HC RX REV CODE- 636/320: Performed by: RADIOLOGY

## 2019-11-18 PROCEDURE — 77030036874 HC SPCR RECTAL HYDRGEL SPACEOAR AGMX -I

## 2019-11-18 PROCEDURE — A4648 IMPLANTABLE TISSUE MARKER: HCPCS

## 2019-11-18 PROCEDURE — 73290000069 HC RAD ONC TIME 1 TO 1.5 HR

## 2019-11-18 PROCEDURE — 77295 3-D RADIOTHERAPY PLAN: CPT

## 2019-11-18 RX ORDER — SODIUM CHLORIDE 9 MG/ML
75 INJECTION, SOLUTION INTRAVENOUS CONTINUOUS
Status: DISCONTINUED | OUTPATIENT
Start: 2019-11-18 | End: 2019-11-22 | Stop reason: HOSPADM

## 2019-11-18 RX ORDER — MIDAZOLAM HYDROCHLORIDE 1 MG/ML
INJECTION, SOLUTION INTRAMUSCULAR; INTRAVENOUS AS NEEDED
Status: DISCONTINUED | OUTPATIENT
Start: 2019-11-18 | End: 2019-11-18 | Stop reason: HOSPADM

## 2019-11-18 RX ORDER — EPHEDRINE SULFATE/0.9% NACL/PF 50 MG/5 ML
SYRINGE (ML) INTRAVENOUS AS NEEDED
Status: DISCONTINUED | OUTPATIENT
Start: 2019-11-18 | End: 2019-11-18 | Stop reason: HOSPADM

## 2019-11-18 RX ORDER — PROPOFOL 10 MG/ML
INJECTION, EMULSION INTRAVENOUS AS NEEDED
Status: DISCONTINUED | OUTPATIENT
Start: 2019-11-18 | End: 2019-11-18 | Stop reason: HOSPADM

## 2019-11-18 RX ORDER — FENTANYL CITRATE 50 UG/ML
INJECTION, SOLUTION INTRAMUSCULAR; INTRAVENOUS AS NEEDED
Status: DISCONTINUED | OUTPATIENT
Start: 2019-11-18 | End: 2019-11-18 | Stop reason: HOSPADM

## 2019-11-18 RX ORDER — FAMOTIDINE 20 MG/1
20 TABLET, FILM COATED ORAL ONCE
Status: COMPLETED | OUTPATIENT
Start: 2019-11-18 | End: 2019-11-18

## 2019-11-18 RX ORDER — DEXAMETHASONE SODIUM PHOSPHATE 4 MG/ML
INJECTION, SOLUTION INTRA-ARTICULAR; INTRALESIONAL; INTRAMUSCULAR; INTRAVENOUS; SOFT TISSUE AS NEEDED
Status: DISCONTINUED | OUTPATIENT
Start: 2019-11-18 | End: 2019-11-18 | Stop reason: HOSPADM

## 2019-11-18 RX ORDER — ONDANSETRON 2 MG/ML
INJECTION INTRAMUSCULAR; INTRAVENOUS AS NEEDED
Status: DISCONTINUED | OUTPATIENT
Start: 2019-11-18 | End: 2019-11-18 | Stop reason: HOSPADM

## 2019-11-18 RX ORDER — SODIUM CHLORIDE 0.9 % (FLUSH) 0.9 %
5-10 SYRINGE (ML) INJECTION ONCE
Status: COMPLETED | OUTPATIENT
Start: 2019-11-18 | End: 2019-11-18

## 2019-11-18 RX ORDER — KETOROLAC TROMETHAMINE 15 MG/ML
INJECTION, SOLUTION INTRAMUSCULAR; INTRAVENOUS AS NEEDED
Status: DISCONTINUED | OUTPATIENT
Start: 2019-11-18 | End: 2019-11-18 | Stop reason: HOSPADM

## 2019-11-18 RX ORDER — CEFAZOLIN SODIUM 2 G/50ML
2 SOLUTION INTRAVENOUS ONCE
Status: COMPLETED | OUTPATIENT
Start: 2019-11-18 | End: 2019-11-18

## 2019-11-18 RX ORDER — LIDOCAINE HYDROCHLORIDE 20 MG/ML
INJECTION, SOLUTION EPIDURAL; INFILTRATION; INTRACAUDAL; PERINEURAL AS NEEDED
Status: DISCONTINUED | OUTPATIENT
Start: 2019-11-18 | End: 2019-11-18 | Stop reason: HOSPADM

## 2019-11-18 RX ADMIN — FAMOTIDINE 20 MG: 20 TABLET, FILM COATED ORAL at 11:22

## 2019-11-18 RX ADMIN — CEFAZOLIN SODIUM 2 G: 2 SOLUTION INTRAVENOUS at 12:08

## 2019-11-18 RX ADMIN — Medication 10 ML: at 11:22

## 2019-11-18 RX ADMIN — DEXAMETHASONE SODIUM PHOSPHATE 4 MG: 4 INJECTION, SOLUTION INTRA-ARTICULAR; INTRALESIONAL; INTRAMUSCULAR; INTRAVENOUS; SOFT TISSUE at 12:03

## 2019-11-18 RX ADMIN — PROPOFOL 200 MG: 10 INJECTION, EMULSION INTRAVENOUS at 12:03

## 2019-11-18 RX ADMIN — Medication 10 MG: at 12:28

## 2019-11-18 RX ADMIN — MIDAZOLAM 2 MG: 1 INJECTION INTRAMUSCULAR; INTRAVENOUS at 12:02

## 2019-11-18 RX ADMIN — KETOROLAC TROMETHAMINE 30 MG: 15 INJECTION, SOLUTION INTRAMUSCULAR; INTRAVENOUS at 12:53

## 2019-11-18 RX ADMIN — ONDANSETRON 4 MG: 2 SOLUTION INTRAMUSCULAR; INTRAVENOUS at 12:03

## 2019-11-18 RX ADMIN — SODIUM CHLORIDE 75 ML/HR: 900 INJECTION, SOLUTION INTRAVENOUS at 11:22

## 2019-11-18 RX ADMIN — LIDOCAINE HYDROCHLORIDE 40 MG: 20 INJECTION, SOLUTION INTRAVENOUS at 12:03

## 2019-11-18 RX ADMIN — FENTANYL CITRATE 100 MCG: 50 INJECTION, SOLUTION INTRAMUSCULAR; INTRAVENOUS at 12:03

## 2019-11-18 RX ADMIN — IOVERSOL 0.5 ML: 678 INJECTION INTRA-ARTERIAL; INTRAVENOUS at 11:24

## 2019-11-18 NOTE — OP NOTES
Date of Procedure: 11/18/2019    Preoperative Diagnosis: Adenocarcinoma of the Prostate, Gee's 9 (4+5), Presenting PSA of 5.36 Clinical stage T2b, N0, M0    Postoperative Diagnosis: same    Procedure:  Transperineal Interstitial Prostate Brachytherapy, Fiducial Marker seed Implant, Space-OAR implant. Surgeon:  Rosalva Barroso MD    Radiation Therapist:  Bertin Paredes MD    Anesthesia: General    Estimated Blood Loss: less than 20 cc    Specimens: none    Findings: Prostate Volume: 20.61 cc, Total of 12 needles placed to deposit 40 seeds for total energy of 72.0 Units.  V100 of 06.9%    Complications: none    Implants: Cs131 radioactive seed sources. INDICATIONS: This is a 81-year-old male, who was noted to have an elevated  PSA to .  He was found to have the above cancer and has decided on combination brachytherapy as his definitve therapy for his prostate cancer.  He has been completely advised of potential risks and complications of the procedure. Risks and benefits, as well as alternatives, have been discussed with the patient and he agrees to proceed.      DESCRIPTION OF PROCEDURE: The patient was identified in the holding area,  given informed consent again, and then was taken to the cystoscopy suite. On the cystoscopy table, the patient was placed in the supine position and  underwent adequate general anesthetic and then was placed in dorsal  lithotomy position, appropriately padded, prepped and draped in the usual  sterile fashion for transperineal procedure. The patient had a time-out  taken, all were in agreement on the procedure. SCDs were placed for DVT  prophylaxis. Appropriate parenteral prophylactic antibiotics were given. The patient had burn precautions, beta blocker concerns addressed. The  patient then underwent further prep with the scrotum placed cephalad, Alexis  catheter with contrast in the balloon was placed to identify the bladder  neck.      The patient then underwent transrectal ultrasonography. Perineum was  prepped and then real-time volumetrics and contouring was  performed. This was correlated with the pre-planned program from Dr. Cindy Solitario with the correlation of these performed. The seed implant  dosimetry was superimposed on real-time imaging and then we were able to  proceed with placement of the needles. The needles were placed in the usual  fashion, and after needle placement, we confirmed adequate coverage of the  procedure, with greater than 99% V100. At this point, we then deposited the  seeds in the longitudinal section of the ultrasound. With retraction points  addressed, we were able to place the seeds appropriately per pre-planned  dosimetry. After the seeds were placed from the left to right, superior  to posterior approach, with all needles removed and seeds placed, we then  performed real-time dosimetry once again and found 0 additional seed  sources were necessary to obtain a V100 of 97.7%. Dr. Cindy Solitario inserted the Fiducial marker seed implants and Space-OAR per his separate dictation. At this point, the procedure was terminated, Alexis catheter removed, perineum was cleansed with saline, and bacitracin ointment was applied. The patient was carefully  taken out of the dorsal lithotomy position, and 2D fluoroscopy confirmed adequate positioning of the seeds, and the patient was taken to the recovery room in stable condition.       Mainor Donohue MD

## 2019-11-18 NOTE — PROGRESS NOTES
1030: Patient arrived for procedure. A&Ox4. Per patients report bowel prep complete. NPO since midnight. Reviewed Allergies and PTA medications. Consent verified and in chart. Denies pain or any other discomfort. Denies delusions, hallucinations, mood swings, depression, euphoria or suicidal ideation.

## 2019-11-18 NOTE — PROGRESS NOTES
POST PROCEDURE NOTE    Pt arrived to post procedure area A at 1309, pt in supine with head of bed elevated, monitors placed. Patient voided 350ml of polina colored urine. Bladder scan performed with the patient having 0ml of residual urine in the bladder. Patient discharged from procedure area A:1405    Post Operative instruction and Discharge information reviewed and copy given to patient and wife. The patient and spouse verbalized understanding. 1405 D/C'd via W/C with daughter driving transportation home.

## 2019-11-18 NOTE — DISCHARGE INSTRUCTIONS
Post-Operative Seed Implant Instructions    Many radioactive metallic seeds were implanted into your prostate. The seeds are similar to a grain of rice in size and shape. Though the seeds are designed to remain in place, they may be expelled through the urine. This should not alarm you; however, it is important that you do not  the radioactive seed with your fingers. Please use a spoon or a pair of tweezers to place the radioactive seed in the toilet and then flush    During the first few days you may have some bruising on the perineum (the place between your anus and your scrotum) or on the scrotum itself. This is caused by the needles (usually 20-30) which are used to place the seeds. This will resolve on its own and usually does not cause any discomfort. For about a week after treatment, you may have some pain or swelling in the area between your scrotum and rectum, and your urine may be reddish-brown. Rarely a larger hematoma may form on the perineum and this will cause some discomfort with sitting. This should be brought to the doctors attention, but it will resolve on its own. You can alternate between an ice pack and heat pack for 10 minutes interval to assist with the reduction of inflammation and pain to perineum. Side Effects    Immediately post procedure: blood in the urine, bruising/tenderness/discoloration at the implant site, and/or swelling at the implant site. These symptoms should subside after a few days. Some patients will have trouble passing urine after the catheter is removed due to swelling in the prostate. You should call Dr. Jordan Jordan or go to Emergency Room if you cannot pass urine within 6 hours of catheter removal or any time if you are having trouble passing your urine. Some patients may require a catheter for 1 week or more after seed implantation. Other:   The following side effects are most likely to occur over the first two months following the procedure: Frequency and/or urgency with urination, burning with urination, a weaker urine stream, as well as frequency and /or urgency of bowel movements. After the initial two months, you should notice a steady decline in these symptoms. Your symptoms should subside completely by the end of six month to a year. Precautions     Abstain from sexual activity for two weeks   After the initial two weeks post procedure, you will be required to use a condom for the next 60 days anytime you have intercourse   Do not let children under  eight years of age sit on your lap for the first sixty days   Do not let pregnant women sit on your lap for the first sixty days. Diet:    Regular, unless you are on a special diet for other reasons.      Medication:     Patient may continue the following medications as directed by Physician:     Atorvastatin Calcium (20 mg) Tablet Oral daily (9/20/2019)  Casodex (50 mg) Tablet Oral Take as Directed (9/20/2019)  Cipro (500 mg) Tablet Oral Take as Directed (11/5/2019)  Colcrys (0. 6 mg) Tablet Oral daily PRN (9/19/2019)  Cymbalta (30 mg) Capsule Oral daily (9/19/2019)  Ergocalciferol (53217 Units) Capsule Oral q 7 days (9/19/2019)  Flomax (0. 4 mg) Capsule Oral daily (9/19/2019)  Glucophage (500 mg) Tablet Oral b. i. d. (9/19/2019)  Glucotrol (10 mg) Tablet Oral b. i. d. (9/19/2019)  Klor-Con M20 Tablet, controlled release Oral daily (9/20/2019)  Lipitor (20 mg) Tablet Oral daily (9/19/2019)  Lotensin HCT (10-12. 5 mg) Tablet Oral daily (9/19/2019)  Lyrica (75 mg) Capsule Oral b. i. d. (9/19/2019)  Naprosyn (500 mg) Tablet Oral Take as Directed (11/5/2019)  NexIUM (40 mg) Capsule Delayed Release Oral daily (9/19/2019)  Norco Tablet Oral PRN (9/19/2019)  Norvasc (10 mg) Tablet Oral daily (9/19/2019)  Proscar (5 mg) Tablet Oral daily (9/19/2019)  Tamsulosin HCl (0. 4 mg) Capsule Oral Take as Directed (11/5/2019)  Toprol XL (200 mg) Tablet SR 24 HR Oral daily (9/19/2019)     Activity:     Avoid heavy lifting or strenuous physical activity for the first two days after the procedure. At that time you may return to your normal activity level if the urine is clear and you feel fine. If the urine is still bloody you should rest and drink plenty of fluids until it is clear, and then you may resume normal activity. Avoid heavy lifting for one month. Avoid sitting on a hard seat (such as a bicycle) for 2 months. Avoid long periods of sitting, such as in a car or on an airplane without taking leg stretching  breaks. Depending on the demands of your job, you may return to work any time during the week after the procedure, noting the precaution about prolonged sitting. You may return to a regular diet as tolerated following the procedure. Do not drink excessive amounts of fluids, as they can make side effects worse. You will experience a decrease in ejaculate following the procedures. This is normal, as the prostate gland is responsible for generating over 80% of the fluid disseminated during  ejaculation. You will most likely experience a reddish color in your ejaculate for a few weeks following the procedure. This is normal and will improve as time  passes, if it persists, see your doctor. Follow up     Your follow up imaging will be at [facility]/[address]  on ___________at ___________ am/pm.    Please call us if you have any questions: [phone]    Radiation Therapy       DISCHARGE SUMMARY from Nurse    PATIENT INSTRUCTIONS:    After general anesthesia or intravenous sedation, for 24 hours or while taking prescription Narcotics:  · Limit your activities  · Do not drive and operate hazardous machinery  · Do not make important personal or business decisions  · Do  not drink alcoholic beverages  · If you have not urinated within 8 hours after discharge, please contact your surgeon on call.     Report the following to your surgeon:  · Excessive pain, swelling, redness or odor of or around the surgical area  · Temperature over 100.5  · Nausea and vomiting lasting longer than 4 hours or if unable to take medications  · Any signs of decreased circulation or nerve impairment to extremity: change in color, persistent  numbness, tingling, coldness or increase pain  · Any questions    What to do at Home:  Recommended activity: Activity as tolerated and no driving for today,     If you experience any of the following symptoms, please follow up with Emergency Department. *  Please give a list of your current medications to your Primary Care Provider. *  Please update this list whenever your medications are discontinued, doses are      changed, or new medications (including over-the-counter products) are added. *  Please carry medication information at all times in case of emergency situations. These are general instructions for a healthy lifestyle:    No smoking/ No tobacco products/ Avoid exposure to second hand smoke  Surgeon General's Warning:  Quitting smoking now greatly reduces serious risk to your health. Obesity, smoking, and sedentary lifestyle greatly increases your risk for illness    A healthy diet, regular physical exercise & weight monitoring are important for maintaining a healthy lifestyle    You may be retaining fluid if you have a history of heart failure or if you experience any of the following symptoms:  Weight gain of 3 pounds or more overnight or 5 pounds in a week, increased swelling in our hands or feet or shortness of breath while lying flat in bed. Please call your doctor as soon as you notice any of these symptoms; do not wait until your next office visit. The discharge information has been reviewed with the patient and spouse. The patient and spouse verbalized understanding.   Discharge medications reviewed with the patient and spouse and appropriate educational materials and side effects teaching were provided.   ___________________________________________________________________________________________________________________________________

## 2019-12-19 ENCOUNTER — APPOINTMENT (OUTPATIENT)
Dept: MRI IMAGING | Age: 74
End: 2019-12-19
Attending: RADIOLOGY
Payer: MEDICARE

## 2019-12-19 ENCOUNTER — HOSPITAL ENCOUNTER (OUTPATIENT)
Dept: RADIATION THERAPY | Age: 74
Discharge: HOME | End: 2019-12-19
Payer: MEDICARE

## 2019-12-19 PROCEDURE — 77334 RADIATION TREATMENT AID(S): CPT

## 2019-12-20 ENCOUNTER — HOSPITAL ENCOUNTER (OUTPATIENT)
Dept: RADIATION THERAPY | Age: 74
Discharge: HOME | End: 2019-12-20
Payer: MEDICARE

## 2019-12-20 PROCEDURE — 77318 BRACHYTX ISODOSE COMPLEX: CPT

## 2019-12-23 ENCOUNTER — HOSPITAL ENCOUNTER (OUTPATIENT)
Dept: RADIATION THERAPY | Age: 74
Discharge: HOME | End: 2019-12-23
Payer: MEDICARE

## 2019-12-24 ENCOUNTER — HOSPITAL ENCOUNTER (OUTPATIENT)
Dept: RADIATION THERAPY | Age: 74
End: 2019-12-24
Payer: MEDICARE

## 2019-12-26 ENCOUNTER — HOSPITAL ENCOUNTER (OUTPATIENT)
Dept: RADIATION THERAPY | Age: 74
Discharge: HOME | End: 2019-12-26
Payer: MEDICARE

## 2019-12-27 ENCOUNTER — HOSPITAL ENCOUNTER (OUTPATIENT)
Dept: RADIATION THERAPY | Age: 74
Discharge: HOME | End: 2019-12-27
Payer: MEDICARE

## 2019-12-30 ENCOUNTER — HOSPITAL ENCOUNTER (OUTPATIENT)
Dept: RADIATION THERAPY | Age: 74
Discharge: HOME | End: 2019-12-30
Payer: MEDICARE

## 2019-12-30 PROCEDURE — 77301 RADIOTHERAPY DOSE PLAN IMRT: CPT

## 2019-12-30 PROCEDURE — 77338 DESIGN MLC DEVICE FOR IMRT: CPT

## 2019-12-30 PROCEDURE — 77300 RADIATION THERAPY DOSE PLAN: CPT

## 2019-12-31 ENCOUNTER — HOSPITAL ENCOUNTER (OUTPATIENT)
Dept: RADIATION THERAPY | Age: 74
Discharge: HOME | End: 2019-12-31
Payer: MEDICARE

## 2019-12-31 PROCEDURE — 77385 HC IMRT TRMT DLVR SMPL: CPT

## 2020-01-01 ENCOUNTER — APPOINTMENT (OUTPATIENT)
Dept: RADIATION THERAPY | Age: 75
End: 2020-01-01
Payer: MEDICARE

## 2020-01-02 ENCOUNTER — HOSPITAL ENCOUNTER (OUTPATIENT)
Dept: RADIATION THERAPY | Age: 75
Discharge: HOME | End: 2020-01-02
Payer: MEDICARE

## 2020-01-02 PROCEDURE — 77385 HC IMRT TRMT DLVR SMPL: CPT

## 2020-01-03 ENCOUNTER — HOSPITAL ENCOUNTER (OUTPATIENT)
Dept: RADIATION THERAPY | Age: 75
Discharge: HOME | End: 2020-01-03
Payer: MEDICARE

## 2020-01-03 PROCEDURE — 77385 HC IMRT TRMT DLVR SMPL: CPT

## 2020-01-06 ENCOUNTER — HOSPITAL ENCOUNTER (OUTPATIENT)
Dept: RADIATION THERAPY | Age: 75
Discharge: HOME | End: 2020-01-06
Payer: MEDICARE

## 2020-01-06 PROCEDURE — 77385 HC IMRT TRMT DLVR SMPL: CPT

## 2020-01-07 ENCOUNTER — HOSPITAL ENCOUNTER (OUTPATIENT)
Dept: RADIATION THERAPY | Age: 75
Discharge: HOME | End: 2020-01-07
Payer: MEDICARE

## 2020-01-07 PROCEDURE — 77385 HC IMRT TRMT DLVR SMPL: CPT

## 2020-01-07 PROCEDURE — 77336 RADIATION PHYSICS CONSULT: CPT

## 2020-01-08 ENCOUNTER — HOSPITAL ENCOUNTER (OUTPATIENT)
Dept: RADIATION THERAPY | Age: 75
Discharge: HOME | End: 2020-01-08
Payer: MEDICARE

## 2020-01-08 PROCEDURE — 77385 HC IMRT TRMT DLVR SMPL: CPT

## 2020-01-09 ENCOUNTER — HOSPITAL ENCOUNTER (OUTPATIENT)
Dept: RADIATION THERAPY | Age: 75
Discharge: HOME | End: 2020-01-09
Payer: MEDICARE

## 2020-01-09 PROCEDURE — 77385 HC IMRT TRMT DLVR SMPL: CPT

## 2020-01-10 ENCOUNTER — HOSPITAL ENCOUNTER (OUTPATIENT)
Dept: RADIATION THERAPY | Age: 75
Discharge: HOME | End: 2020-01-10
Payer: MEDICARE

## 2020-01-10 PROCEDURE — 77385 HC IMRT TRMT DLVR SMPL: CPT

## 2020-01-13 ENCOUNTER — HOSPITAL ENCOUNTER (OUTPATIENT)
Dept: RADIATION THERAPY | Age: 75
Discharge: HOME | End: 2020-01-13
Payer: MEDICARE

## 2020-01-13 PROCEDURE — 77385 HC IMRT TRMT DLVR SMPL: CPT

## 2020-01-14 ENCOUNTER — HOSPITAL ENCOUNTER (OUTPATIENT)
Dept: RADIATION THERAPY | Age: 75
Discharge: HOME | End: 2020-01-14
Payer: MEDICARE

## 2020-01-14 PROCEDURE — 77385 HC IMRT TRMT DLVR SMPL: CPT

## 2020-01-14 PROCEDURE — 77336 RADIATION PHYSICS CONSULT: CPT

## 2020-01-15 ENCOUNTER — HOSPITAL ENCOUNTER (OUTPATIENT)
Dept: RADIATION THERAPY | Age: 75
Discharge: HOME | End: 2020-01-15
Payer: MEDICARE

## 2020-01-15 PROCEDURE — 77385 HC IMRT TRMT DLVR SMPL: CPT

## 2020-01-16 ENCOUNTER — HOSPITAL ENCOUNTER (OUTPATIENT)
Dept: RADIATION THERAPY | Age: 75
Discharge: HOME | End: 2020-01-16
Payer: MEDICARE

## 2020-01-16 PROCEDURE — 77385 HC IMRT TRMT DLVR SMPL: CPT

## 2020-01-17 ENCOUNTER — APPOINTMENT (OUTPATIENT)
Dept: RADIATION THERAPY | Age: 75
End: 2020-01-17
Payer: MEDICARE

## 2020-01-20 ENCOUNTER — HOSPITAL ENCOUNTER (OUTPATIENT)
Dept: RADIATION THERAPY | Age: 75
Discharge: HOME | End: 2020-01-20
Payer: MEDICARE

## 2020-01-20 PROCEDURE — 77385 HC IMRT TRMT DLVR SMPL: CPT

## 2020-01-21 ENCOUNTER — HOSPITAL ENCOUNTER (OUTPATIENT)
Dept: RADIATION THERAPY | Age: 75
Discharge: HOME | End: 2020-01-21
Payer: MEDICARE

## 2020-01-21 PROCEDURE — 77385 HC IMRT TRMT DLVR SMPL: CPT

## 2020-01-22 ENCOUNTER — HOSPITAL ENCOUNTER (OUTPATIENT)
Dept: RADIATION THERAPY | Age: 75
Discharge: HOME | End: 2020-01-22
Payer: MEDICARE

## 2020-01-22 PROCEDURE — 77385 HC IMRT TRMT DLVR SMPL: CPT

## 2020-01-23 ENCOUNTER — HOSPITAL ENCOUNTER (OUTPATIENT)
Dept: RADIATION THERAPY | Age: 75
Discharge: HOME | End: 2020-01-23
Payer: MEDICARE

## 2020-01-23 PROCEDURE — 77385 HC IMRT TRMT DLVR SMPL: CPT

## 2020-01-24 ENCOUNTER — HOSPITAL ENCOUNTER (OUTPATIENT)
Dept: RADIATION THERAPY | Age: 75
Discharge: HOME | End: 2020-01-24
Payer: MEDICARE

## 2020-01-24 PROCEDURE — 77385 HC IMRT TRMT DLVR SMPL: CPT

## 2020-01-27 ENCOUNTER — HOSPITAL ENCOUNTER (OUTPATIENT)
Dept: RADIATION THERAPY | Age: 75
Discharge: HOME | End: 2020-01-27
Payer: MEDICARE

## 2020-01-27 PROCEDURE — 77385 HC IMRT TRMT DLVR SMPL: CPT

## 2020-01-28 ENCOUNTER — HOSPITAL ENCOUNTER (OUTPATIENT)
Dept: RADIATION THERAPY | Age: 75
Discharge: HOME | End: 2020-01-28
Payer: MEDICARE

## 2020-01-28 PROCEDURE — 77385 HC IMRT TRMT DLVR SMPL: CPT

## 2020-01-29 ENCOUNTER — HOSPITAL ENCOUNTER (OUTPATIENT)
Dept: RADIATION THERAPY | Age: 75
Discharge: HOME | End: 2020-01-29
Payer: MEDICARE

## 2020-01-29 PROCEDURE — 77385 HC IMRT TRMT DLVR SMPL: CPT

## 2020-01-29 PROCEDURE — 77336 RADIATION PHYSICS CONSULT: CPT

## 2020-01-30 ENCOUNTER — HOSPITAL ENCOUNTER (OUTPATIENT)
Dept: RADIATION THERAPY | Age: 75
Discharge: HOME | End: 2020-01-30
Payer: MEDICARE

## 2020-01-30 PROCEDURE — 77385 HC IMRT TRMT DLVR SMPL: CPT

## 2020-01-31 ENCOUNTER — HOSPITAL ENCOUNTER (OUTPATIENT)
Dept: RADIATION THERAPY | Age: 75
Discharge: HOME | End: 2020-01-31
Payer: MEDICARE

## 2020-01-31 PROCEDURE — 77385 HC IMRT TRMT DLVR SMPL: CPT

## 2020-02-03 ENCOUNTER — HOSPITAL ENCOUNTER (OUTPATIENT)
Dept: RADIATION THERAPY | Age: 75
Discharge: HOME | End: 2020-02-03
Payer: MEDICARE

## 2020-02-03 PROCEDURE — 77385 HC IMRT TRMT DLVR SMPL: CPT

## 2020-02-04 ENCOUNTER — HOSPITAL ENCOUNTER (OUTPATIENT)
Dept: RADIATION THERAPY | Age: 75
Discharge: HOME | End: 2020-02-04
Payer: MEDICARE

## 2020-02-04 PROCEDURE — 77385 HC IMRT TRMT DLVR SMPL: CPT

## 2020-02-05 ENCOUNTER — HOSPITAL ENCOUNTER (OUTPATIENT)
Dept: RADIATION THERAPY | Age: 75
Discharge: HOME | End: 2020-02-05
Payer: MEDICARE

## 2020-02-05 PROCEDURE — 77385 HC IMRT TRMT DLVR SMPL: CPT

## 2020-02-05 PROCEDURE — 77336 RADIATION PHYSICS CONSULT: CPT

## 2020-03-27 ENCOUNTER — NURSE NAVIGATOR (OUTPATIENT)
Dept: SURGERY | Age: 75
End: 2020-03-27

## (undated) DEVICE — LUB JELLY PETROLEUM 1 OZ -- CONVERT TO ITEM 357604

## (undated) DEVICE — Z DUP USE 2565107 PACK SURG PROC LEG CYSTO T-DRAPE REINF TBL CVR HND TWL

## (undated) DEVICE — KENDALL SCD EXPRESS SLEEVES, KNEE LENGTH, MEDIUM: Brand: KENDALL SCD

## (undated) DEVICE — SOLUTION IRRIG 3000ML 0.9% SOD CHL FLX CONT 0797208] ICU MEDICAL INC]

## (undated) DEVICE — STRAP CATH CTH-SECUR UNIV 2IN --

## (undated) DEVICE — MEDI-VAC NON-CONDUCTIVE SUCTION TUBING: Brand: CARDINAL HEALTH

## (undated) DEVICE — MAX-CORE® DISPOSABLE CORE BIOPSY INSTRUMENT, 18G X 20CM: Brand: MAX-CORE

## (undated) DEVICE — Y-TYPE TUR/BLADDER IRRIGATION SET, REGULATING CLAMP

## (undated) DEVICE — GAUZE SPONGES,16 PLY: Brand: CURITY

## (undated) DEVICE — URINARY LEG BAG COMBINATION PACK: Brand: HOLLISTER

## (undated) DEVICE — KIT CLN UP BON SECOURS MARYV

## (undated) DEVICE — Device

## (undated) DEVICE — CATHETER URETH BLLN 30CC 22FR SIL ALLY AND HYDRGEL F INF

## (undated) DEVICE — STERILE POLYISOPRENE POWDER-FREE SURGICAL GLOVES: Brand: PROTEXIS

## (undated) DEVICE — COVER PRB W1XL11.8IN ENDOCAVITY CLR E BND NEOGUARD

## (undated) DEVICE — SYR 10ML LUER LOK 1/5ML GRAD --

## (undated) DEVICE — 3M™ BAIR PAWS FLEX™ WARMING GOWN, STANDARD, 20 PER CASE 81003: Brand: BAIR PAWS™

## (undated) DEVICE — GDWIRE UROL STR 150CM FLX TP -- BX/5 SENSOR

## (undated) DEVICE — TRAY PREP DRY W/ PREM GLV 2 APPL 6 SPNG 2 UNDPD 1 OVERWRAP

## (undated) DEVICE — SKIN MARKER,REGULAR TIP WITH RULER AND LABELS: Brand: DEVON

## (undated) DEVICE — 4-PORT MANIFOLD: Brand: NEPTUNE 2

## (undated) DEVICE — STERILE PACKED. BORE DIAMETER 1.6 MM; ANGLE OF INSERTION 19° TO THE LONG AXIS OF THE TRANSDUCER: Brand: SINGLE-USE BIPLANE BIOPSY GUIDE

## (undated) DEVICE — STER SINGLE BASIN SET W/BOWLS: Brand: CARDINAL HEALTH

## (undated) DEVICE — GOWN,PREVENTION PLUS,XLN/XL,ST,24/CS: Brand: MEDLINE

## (undated) DEVICE — SOLUTION IV 1000ML 0.9% SOD CHL

## (undated) DEVICE — BLADE ASSEMB CLP HAIR FINE --

## (undated) DEVICE — BAG DRAINAGE CUST DISP